# Patient Record
Sex: FEMALE | Race: WHITE | Employment: OTHER | URBAN - METROPOLITAN AREA
[De-identification: names, ages, dates, MRNs, and addresses within clinical notes are randomized per-mention and may not be internally consistent; named-entity substitution may affect disease eponyms.]

---

## 2019-11-05 ENCOUNTER — TELEPHONE (OUTPATIENT)
Dept: INTERNAL MEDICINE CLINIC | Age: 84
End: 2019-11-05

## 2019-11-05 NOTE — TELEPHONE ENCOUNTER
Daughter Rios Tony calling, says mom was pt of RD but moved to [place]. Coming back to the area to go into assisted living but needs a pe and papers filled out. Wants to know if RD will take her back as pt?

## 2019-11-22 ENCOUNTER — OFFICE VISIT (OUTPATIENT)
Dept: CARDIOLOGY CLINIC | Age: 84
End: 2019-11-22

## 2019-11-22 VITALS
DIASTOLIC BLOOD PRESSURE: 88 MMHG | HEIGHT: 64 IN | BODY MASS INDEX: 31.41 KG/M2 | SYSTOLIC BLOOD PRESSURE: 162 MMHG | OXYGEN SATURATION: 98 % | HEART RATE: 76 BPM | WEIGHT: 184 LBS

## 2019-11-22 DIAGNOSIS — I48.91 ATRIAL FIBRILLATION, UNSPECIFIED TYPE (HCC): Primary | ICD-10-CM

## 2019-11-22 RX ORDER — LORATADINE 10 MG/1
10 TABLET ORAL 2 TIMES DAILY
COMMUNITY
End: 2022-06-08

## 2019-11-22 RX ORDER — LEVOTHYROXINE SODIUM 88 UG/1
TABLET ORAL
COMMUNITY

## 2019-11-22 RX ORDER — TRAZODONE HYDROCHLORIDE 50 MG/1
TABLET ORAL
COMMUNITY
End: 2022-06-08

## 2019-11-22 RX ORDER — OXYBUTYNIN CHLORIDE 5 MG/1
5 TABLET ORAL 2 TIMES DAILY
COMMUNITY
End: 2022-06-08

## 2019-11-22 NOTE — PROGRESS NOTES
Igor Mary Ann Simba    \"Needs a pacemaker\"    HPI    Saadia Choi is a 80 y. o. with h/o persistent AFib, CAD s/p PCI LAD 2005, HTN, DM2, Dyslipidemia, h/o TIA here to reestablish her cardiovascular care after a syncopal episode. This is a previous patient of our practice but who relocated-had been living in [place] for the last several years. She comes with her daughter today as they have just recently moved back to [place] and patient will be placed in an assisted living in the very near future. Unfortunately they have very little records from her hospitalizations and I have no records from her previous cardiologist.  27 the history comes from the daughter who is a very good historian and takes care of her mother. Apparently fell last year as a result of a stroke but unfortunately brain bleed because of the fall. She had been on Eliquis at the time for her atrial fibrillation. The Eliquis was stopped-he has not been on it since. She has suffered with UTIs frequent falls again which many of these falls were not witnessed. It is not clear if she truly lost consciousness regardless she was hospitalized many times and this happened. Previously been on Eliquis, hydralazine, hydrochlorothiazide, metoprolol and Lasix. Since daughter says her heart rate was in the 20s at night and so her metoprolol was stopped but still the heart rates could go down into the 40s. Also having low blood pressures and problems with potassium so essentially all of her medications were stopped-and she was placed on a 24-hour Holter monitor. 2 days before they had plan to move back here to [place] she says the cardiologist called her and told her that her mom needs a pacemaker. They were already moving he said it was okay for her to establish with somebody here as an urgent need to do it right away. Luckily she has not passed out again and not had any recent problems.   No swelling chest pain or shortness of breath or recurrent syncope. Does complain of being tired and weak-should be noted that she is gone through quite a bit of rehab and physical therapy and her functional status is greatly improved to where it had been after her last stroke. Past Medical History:   Diagnosis Date    Adrenal mass ([address]     benign on serial f/u by CT    Allergic rhinitis 10/30/2012    Atrial fibrillation ([address] 1/14/2011    CAD (coronary artery disease), native coronary artery     Calculus of kidney     Dr. [facility])     left breast carcinoma    Coronary stent 9/2005    drug eluting stent to proximal LAD    Diabetes mellitus ([address]     microalbuminuria    Dyslipidemia     History of echocardiogram 11/30/2009    Tech diff. A-Fib. EF 60-65%. Mild conc LVH. Mild LAE. Mild MR. Mod RVE. Mod TR.  RVSP 45-50.  History of myocardial perfusion scan 08/12/2013    No ischemia or prior infarction. Very sm area of apical thinning. Hyperdynamic LV function. EF >80%. Equivocally positive EKG on pharm stress test.  Low risk.  Hypertension     Osteopenia     DEXA -1.4 spine, -1.5 hip w FRAX 11 and 2.2 from 6/11    Pulmonary hypertension, secondary ([address]     S/P cardiac cath 08/29/2005    LM normal.  mLAD 100%. mCx 25%. PDA 30%. RCA mild. EF 60-65%. AA mildly dilated.         Past Surgical History:   Procedure Laterality Date    CARDIAC SURG PROCEDURE UNLIST  9/1/2005    coronary angiography stent deplyoment    CARDIAC SURG PROCEDURE UNLIST  8/13    thallium negative ef 80%    HX CORONARY STENT PLACEMENT      HX GI  03/20/08    Laproscopic low anterior resection Dr. Doyle Clancy  02/2007    left modified     HX UROLOGICAL  6/2/2016    [address], Dr. Sonali Starks, Cystoscopy, retrograde ureteroscopy, double-J catheter    [place] COLONOSCOPY FLX DX W/COLLJ [facility] REH[facility]ILITATION WHEN PFRMD  2008    s/p [facility] for adenoma Dr. Russ Lozoya       Current Outpatient Medications   Medication Sohail Barker cyanocobalamin, vitamin B-12, 2,000 mcg tab Take 2,000 mcg by mouth daily. 30 Tab 0    folic acid (FOLVITE) 1 mg tablet Take 1 Tab by mouth daily. 30 Tab 0    hydrALAZINE (APRESOLINE) 10 mg tablet Take 1 Tab by mouth three (3) times daily. 90 Tab 0    polyethylene glycol (MIRALAX) 17 gram packet Take 1 Packet by mouth daily. 30 Packet 0    OTHER This is to certify that Ivy Morris was admitted to [facility] from 5/30/2016 to 6/15/16. Please feel free to contact my office if you have any questions or concerns. Thank you. 1 Each 0    HYDROcodone-acetaminophen (NORCO) 5-325 mg per tablet Take 1 Tab by mouth every eight (8) hours as needed for Pain. Max Daily Amount: 3 Tabs. 12 Tab 0    OTHER Incentive Spirometry- use as directed 1 Each 0    apixaban (ELIQUIS) 5 mg tablet Take 5 mg by mouth two (2) times a day.  escitalopram oxalate (LEXAPRO) 10 mg tablet Take 10 mg by mouth daily as needed.  levothyroxine (SYNTHROID) 75 mcg tablet TAKE 1 TABLET EVERY DAY BEFORE BREAKFAST 90 Tab 3    atorvastatin (LIPITOR) 40 mg tablet Take 1 Tab by mouth daily. 90 Tab 3    fluticasone (FLONASE) 50 mcg/actuation nasal spray 2 sprays each nostril daily 1 Bottle 12    pantoprazole (PROTONIX) 40 mg tablet Take 1 Tab by mouth daily. 90 Tab 3    ergocalciferol (ERGOCALCIFEROL) 50,000 unit capsule Take 1 Cap by mouth every seven (7) days. 13 Cap 4    ramipril (ALTACE) 10 mg capsule Take 1 Cap by mouth daily. 90 Cap 3    budesonide-formoterol (SYMBICORT) 160-4.5 mcg/Actuation HFA inhaler Take 2 Puffs by inhalation as needed.  albuterol (VENTOLIN HFA) 90 mcg/Actuation inhaler Take 1 Puff by inhalation as needed.          Allergies   Allergen Reactions    Amiodarone Other (comments)     hallucinations    Codeine Unable to Obtain    Sulfa (Sulfonamide Antibiotics) Unable to Obtain       Social History     Socioeconomic History    Marital status:      Spouse name: Not on file    Number of children: 6    Years of education: Not on file    Highest education level: Not on file   Occupational History    Occupation: retired    Social Needs    Financial resource strain: Not on file    Food insecurity:     Worry: Not on file     Inability: Not on file   [employer] needs:     Medical: Not on file     Non-medical: Not on file   Tobacco Use    Smoking status: Never Smoker    Smokeless tobacco: Never Used   Substance and Sexual Activity    Alcohol use: No    Drug use: No    Sexual activity: Not on file   Lifestyle    Physical activity:     Days per week: Not on file     Minutes per session: Not on file    Stress: Not on file   Relationships    Social connections:     Talks on phone: Not on file     Gets together: Not on file     Attends Mandaeism service: Not on file     Active member of club or organization: Not on file     Attends meetings of clubs or organizations: Not on file     Relationship status: Not on file    Intimate partner violence:     Fear of current or ex partner: Not on file     Emotionally abused: Not on file     Physically abused: Not on file     Forced sexual activity: Not on file   Other Topics Concern    Not on file   Social History Narrative    Not on file        FH: n/a    Review of Systems    14 pt Review of Systems is negative unless otherwise mentioned in the HPI.     Wt Readings from Last 3 Encounters:   11/22/19 83.5 kg (184 lb)   06/22/16 77.1 kg (170 lb)   06/15/16 77.2 kg (170 lb 3.2 oz)     Temp Readings from Last 3 Encounters:   06/22/16 98.4 °F (36.9 °C)   06/15/16 98.2 °F (36.8 °C)   08/12/15 98.6 °F (37 °C)     BP Readings from Last 3 Encounters:   11/22/19 162/88   06/23/16 102/47   06/15/16 112/60     Pulse Readings from Last 3 Encounters:   11/22/19 76   06/23/16 (!) 48   06/15/16 (!) 56            Physical Exam:    Visit Vitals  /88 (BP 1 Location: Right arm, BP Patient Position: Sitting)   Pulse 76   Ht 5' 4\" (1.626 m)   Wt 83.5 kg (184 lb)   SpO2 98%   BMI 31.58 kg/m²      Physical Exam  Vitals signs reviewed. HENT:      Head: Normocephalic and atraumatic. Eyes:      Pupils: Pupils are equal, round, and reactive to light. Cardiovascular:      Rate and Rhythm: Normal rate and regular rhythm. Heart sounds: Normal heart sounds. No murmur. No friction rub. No gallop. Pulmonary:      Effort: Pulmonary effort is normal. No respiratory distress. Breath sounds: Normal breath sounds. No wheezing or rales. Chest:      Chest wall: No tenderness. Abdominal:      General: Bowel sounds are normal.      Palpations: Abdomen is soft. Musculoskeletal:         General: No tenderness. Skin:     General: Skin is warm and dry. Neurological:      Mental Status: She is alert and oriented to person, place, and time. EKG today shows: NSR, normal axis and intervals, no ST segment abnormalities    Impression and Plan:  Patt Montesinos is a 80 y. o. with:    1.) pAF with likely SSS (HRs dropping to 40s & h/o syncope)  2.) CAD s/p PCI LAD 05'  3.) TIA/ CVA with brain bleed s/p fall  4.) DM2  5.) HTN  6.) Dyslipidemia    1.) Need to request records from Tx cardiologist, incl holter monitor  2.) Once have documented SSS- will plan to proceed with PPM  3.) Agree no anticoag despite high stroke risk- due to brain bleed and falls  4.) Also likely need to add back an anti-HTN med, probably Hydralazine   5.) RTC 3 months, plan to call her back in a week with plan, please call us if you dont hear back     EKG and HR/ SBP stable today but evidence of SSS has been documented on recent holter  Once we get these records, I will send her for an outpt PPM with my partner  Recs to follow    Thank you for allowing me to participate in the care of your patient, please do not hesitate to call with questions or concerns.     Kindest Regards,    Eneida Tamayo, DO

## 2020-02-26 ENCOUNTER — OFFICE VISIT (OUTPATIENT)
Dept: CARDIOLOGY CLINIC | Age: 85
End: 2020-02-26

## 2020-02-26 VITALS
WEIGHT: 193 LBS | HEIGHT: 64 IN | DIASTOLIC BLOOD PRESSURE: 68 MMHG | BODY MASS INDEX: 32.95 KG/M2 | OXYGEN SATURATION: 98 % | SYSTOLIC BLOOD PRESSURE: 128 MMHG | HEART RATE: 80 BPM

## 2020-02-26 DIAGNOSIS — I49.5 SICK SINUS SYNDROME (HCC): Primary | ICD-10-CM

## 2020-02-26 DIAGNOSIS — I48.91 ATRIAL FIBRILLATION, UNSPECIFIED TYPE (HCC): ICD-10-CM

## 2020-02-26 NOTE — PATIENT INSTRUCTIONS
[facility] Pacemaker Placement 1. You are scheduled to have a Pacemaker Placement on  3/20/20 , at 9:15 am.    Please check in at 8:15 am. 
 
2. Please go to [facility] and park in the outpatient parking lot that is located around to the back of the hospital and enter through the [facility] building. Once you enter through the [facility] check in with the  there. The  will either give you directions or assist you in getting to the cath holding area. 3.  You are not to eat or drink anything after midnight the night before your procedure. 4. Please continue to take your medications with a small sip of water on the morning of the procedure with the following exceptions: n/a  
 
5. If you are diabetic, do not take your insulin/sugar pill the morning of the procedure. 6. We encourage families to wait in the waiting room on the first floor while the procedure is being done. The Doctor will come out and talk with you as soon as the procedure is over. 7. There is the possibility that you may spend the night in the hospital, depending on the results of the procedure. This will be determined after the procedure is done. 8.   If you or your family have any questions, please call our office Monday-Friday 9:00am  
      -4:30 pm , at [phone], and ask to speak to one of the nurses.

## 2020-02-26 NOTE — PROGRESS NOTES
Leonarda Marie Simba    \"Needs a pacemaker\"    HPI    Lyudmila Starkey is a 80 y. o. with h/o persistent AFib, CAD s/p PCI LAD 2005, HTN, DM2, Dyslipidemia, h/o TIA here to reestablish her cardiovascular care after a syncopal episode. This is a previous patient of our practice but who relocated-had been living in [place] for the last several years. She comes with her daughter today as they have just recently moved back to [place] and patient will be placed in an assisted living. I was able to obtain records- please see media and pt having HRs as low as 39 with such severe fatigue she can barely make the bed. Apparently fell last year as a result of a stroke but unfortunately brain bleed because of the fall. She had been on Eliquis at the time for her atrial fibrillation. The Eliquis was stopped-she has not been on it since. She has suffered with UTIs frequent falls again which many of these falls were not witnessed. It is not clear if she truly lost consciousness regardless she was hospitalized many times and this happened. Previously been on Eliquis, hydralazine, hydrochlorothiazide, metoprolol and Lasix. Since daughter says her heart rate was in the 20s at night and so her metoprolol was stopped but still the heart rates could go down into the 40s. Also having low blood pressures and problems with potassium so essentially all of her medications were stopped-and she was placed on a 24-hour Holter monitor. 2 days before they had plan to move back here to [place] she says the cardiologist called her and told her that her mom needs a pacemaker. They were already moving he said it was okay for her to establish with somebody here as an urgent need to do it right away. Luckily she has not passed out again and not had any recent problems. No swelling, chest pain or shortness of breath or recurrent syncope.   Does complain of being tired and weak-should be noted that she is gone through quite a bit of rehab and physical therapy and her functional status is greatly improved to where it had been after her last stroke. The left side of her body is generally weaker from her stroke and she feels her left hand is \"puffy\" today but otherwise no new changes. No recent infections etc. Still so tired, she cant make her bed. Past Medical History:   Diagnosis Date    Adrenal [facility] [address]     benign on serial f/u by CT    Allergic rhinitis 10/30/2012    Atrial fibrillation ([facility] 1/14/2011    CAD (coronary artery disease), native coronary artery     Calculus of kidney     Dr. Thelma Hernandez [facility])     left breast carcinoma    Coronary stent 9/2005    drug eluting stent to proximal LAD    Diabetes mellitus ([facility]     microalbuminuria    Dyslipidemia     History of echocardiogram 11/30/2009    Tech diff. A-Fib. EF 60-65%. Mild conc LVH. Mild LAE. Mild MR. Mod RVE. Mod TR.  RVSP 45-50.  History of myocardial perfusion scan 08/12/2013    No ischemia or prior infarction. Very sm area of apical thinning. Hyperdynamic LV function. EF >80%. Equivocally positive EKG on pharm stress test.  Low risk.  Hypertension     Osteopenia     DEXA -1.4 spine, -1.5 hip w FRAX 11 and 2.2 from 6/11    Pulmonary hypertension, secondary ([facility]     S/P cardiac cath 08/29/2005    LM normal.  mLAD 100%. mCx 25%. PDA 30%. RCA mild. EF 60-65%. AA mildly dilated.         Past Surgical History:   Procedure Laterality Date    CARDIAC SURG PROCEDURE UNLIST  9/1/2005    coronary angiography stent deplyoment    CARDIAC SURG PROCEDURE UNLIST  8/13    thallium negative ef 80%    HX CORONARY STENT PLACEMENT      HX GI  03/20/08    Laproscopic low anterior resection Dr. Pasquale Mcfarlane [address]  02/2007    left modified     HX UROLOGICAL  6/2/2016    [facility], Dr. Enoch Moore, Cystoscopy, retrograde ureteroscopy, double-J catheter    [place] COLONOSCOPY FLX DX W/COLLJ [facility] WHEN PFRMD  2008    s/p [facility] for adenoma  Birgit Jim       Current Outpatient Medications   Medication Sig Dispense Refill    levothyroxine (SYNTHROID) 88 mcg tablet Take  by mouth Daily (before breakfast).  loratadine (CLARITIN) 10 mg tablet Take 10 mg by mouth two (2) times a day.  traZODone (DESYREL) 50 mg tablet Take  by mouth nightly.  oxybutynin (DITROPAN) 5 mg tablet Take 5 mg by mouth two (2) times a day.  folic acid (FOLVITE) 1 mg tablet Take 1 Tab by mouth daily. 30 Tab 0    hydrALAZINE (APRESOLINE) 10 mg tablet Take 1 Tab by mouth three (3) times daily. 90 Tab 0    OTHER This is to certify that Oswald Hernandez was admitted to [facility] from 5/30/2016 to 6/15/16. Please feel free to contact my office if you have any questions or concerns. Thank you. 1 Each 0    HYDROcodone-acetaminophen (NORCO) 5-325 mg per tablet Take 1 Tab by mouth every eight (8) hours as needed for Pain. Max Daily Amount: 3 Tabs. 12 Tab 0    OTHER Incentive Spirometry- use as directed 1 Each 0    escitalopram oxalate (LEXAPRO) 10 mg tablet Take 10 mg by mouth daily as needed.  atorvastatin (LIPITOR) 40 mg tablet Take 1 Tab by mouth daily. 90 Tab 3    fluticasone (FLONASE) 50 mcg/actuation nasal spray 2 sprays each nostril daily 1 Bottle 12    pantoprazole (PROTONIX) 40 mg tablet Take 1 Tab by mouth daily. 90 Tab 3    budesonide-formoterol (SYMBICORT) 160-4.5 mcg/Actuation HFA inhaler Take 2 Puffs by inhalation as needed.  albuterol (VENTOLIN HFA) 90 mcg/Actuation inhaler Take 1 Puff by inhalation as needed.          Allergies   Allergen Reactions    Amiodarone Other (comments)     hallucinations    Codeine Unable to Obtain    Sulfa (Sulfonamide Antibiotics) Unable to Obtain       Social History     Socioeconomic History    Marital status:      Spouse name: Not on file    Number of children: 6    Years of education: Not on file    Highest education level: Not on file   Occupational History    Occupation: retired    Social Needs    Financial resource strain: Not on file    Food insecurity:     Worry: Not on file     Inability: Not on file   [employer] needs:     Medical: Not on file     Non-medical: Not on file   Tobacco Use    Smoking status: Never Smoker    Smokeless tobacco: Never Used   Substance and Sexual Activity    Alcohol use: No    Drug use: No    Sexual activity: Not on file   Lifestyle    Physical activity:     Days per week: Not on file     Minutes per session: Not on file    Stress: Not on file   Relationships    Social connections:     Talks on phone: Not on file     Gets together: Not on file     Attends Caodaism service: Not on file     Active member of club or organization: Not on file     Attends meetings of clubs or organizations: Not on file     Relationship status: Not on file    Intimate partner violence:     Fear of current or ex partner: Not on file     Emotionally abused: Not on file     Physically abused: Not on file     Forced sexual activity: Not on file   Other Topics Concern    Not on file   Social History Narrative    Not on file        FH: n/a    Review of Systems    14 pt Review of Systems is negative unless otherwise mentioned in the HPI. Wt Readings from Last 3 Encounters:   11/22/19 83.5 kg (184 lb)   06/22/16 77.1 kg (170 lb)   06/15/16 77.2 kg (170 lb 3.2 oz)     Temp Readings from Last 3 Encounters:   06/22/16 98.4 °F (36.9 °C)   06/15/16 98.2 °F (36.8 °C)   08/12/15 98.6 °F (37 °C)     BP Readings from Last 3 Encounters:   11/22/19 162/88   06/23/16 102/47   06/15/16 112/60     Pulse Readings from Last 3 Encounters:   11/22/19 76   06/23/16 (!) 48   06/15/16 (!) 56            Physical Exam:    Visit Vitals  Ht 5' 4\" (1.626 m)   BMI 31.58 kg/m²      Physical Exam  Vitals signs reviewed. HENT:      Head: Normocephalic and atraumatic. Eyes:      Pupils: Pupils are equal, round, and reactive to light.    Cardiovascular:      Rate and Rhythm: Normal rate and regular rhythm. Heart sounds: Normal heart sounds. No murmur. No friction rub. No gallop. Pulmonary:      Effort: Pulmonary effort is normal. No respiratory distress. Breath sounds: Normal breath sounds. No wheezing or rales. Chest:      Chest wall: No tenderness. Abdominal:      General: Bowel sounds are normal.      Palpations: Abdomen is soft. Musculoskeletal:         General: No tenderness. Skin:     General: Skin is warm and dry. Neurological:      Mental Status: She is alert and oriented to person, place, and time. EKG last: NSR, normal axis and intervals, no ST segment abnormalities    Impression and Plan:  Lynne Carlson is a 80 y. o. with:    1.) pAF with likely SSS (HRs dropping to 39, see monitor scanned into media)  2.) CAD s/p PCI LAD 05'  3.) TIA/ CVA with brain bleed s/p fall  4.) DM2  5.) HTN  6.) Dyslipidemia  7.) Normal LV function echo 12/2019    1.) Plan for outpt PPM for SSS, indications risks benefits etc dw pt and daughter who want to proceed  2.) Once have documented SSS- will plan to proceed with PPM  3.) Agree no anticoag despite high stroke risk- due to brain bleed and falls  4.) RTC after device close recheck, then likely q 6 months alternating with NP    Thank you for allowing me to participate in the care of your patient, please do not hesitate to call with questions or concerns.     Kindest Regards,    Jyoti Weaver, DO

## 2020-02-26 NOTE — PROGRESS NOTES
Jak Longoria presents today for   Chief Complaint   Patient presents with    Irregular Heart Beat     6 month follow up       Jak Longoria preferred language for health care discussion is english/other. Is someone accompanying this pt? no    Is the patient using any DME equipment during OV?no    Depression Screening:  3 most recent PHQ Screens 2/26/2020   Little interest or pleasure in doing things Not at all   Feeling down, depressed, irritable, or hopeless Not at all   Total Score PHQ 2 0       Learning Assessment:  Learning Assessment 2/11/2014   PRIMARY LEARNER Patient   HIGHEST LEVEL OF EDUCATION - PRIMARY LEARNER  DID NOT GRADUATE HIGH SCHOOL   BARRIERS PRIMARY LEARNER NONE   CO-LEARNER CAREGIVER No   PRIMARY LANGUAGE ENGLISH   LEARNER PREFERENCE PRIMARY DEMONSTRATION   ANSWERED BY patient   RELATIONSHIP SELF       Abuse Screening:  No flowsheet data found. Fall Risk  Fall Risk Assessment, last 12 mths 2/26/2020   Able to walk? Yes   Fall in past 12 months? No       Pt currently taking Anticoagulant therapy?no    Coordination of Care:  1. Have you been to the ER, urgent care clinic since your last visit? Hospitalized since your last visit? no    2. Have you seen or consulted any other health care providers outside of the [address] [place] [address] since your last visit? Include any pap smears or colon screening. no

## 2020-03-09 ENCOUNTER — HOSPITAL ENCOUNTER (OUTPATIENT)
Dept: PREADMISSION TESTING | Age: 85
Discharge: HOME | End: 2020-03-09
Payer: MEDICARE

## 2020-03-09 ENCOUNTER — HOSPITAL ENCOUNTER (OUTPATIENT)
Dept: GENERAL RADIOLOGY | Age: 85
Discharge: HOME | End: 2020-03-09
Payer: MEDICARE

## 2020-03-09 DIAGNOSIS — I48.91 ATRIAL FIBRILLATION, UNSPECIFIED TYPE (HCC): ICD-10-CM

## 2020-03-09 DIAGNOSIS — I49.5 SICK SINUS SYNDROME (HCC): ICD-10-CM

## 2020-03-09 LAB
ALBUMIN SERPL-MCNC: 3.6 G/DL (ref 3.4–5)
ALBUMIN/GLOB SERPL: 0.9 {RATIO} (ref 0.8–1.7)
ALP SERPL-CCNC: 137 U/L (ref 45–117)
ALT SERPL-CCNC: 22 U/L (ref 13–56)
ANION GAP SERPL CALC-SCNC: 7 MMOL/L (ref 3–18)
AST SERPL-CCNC: 23 U/L (ref 10–38)
BASOPHILS # BLD: 0.1 K/UL (ref 0–0.1)
BASOPHILS NFR BLD: 1 % (ref 0–2)
BILIRUB SERPL-MCNC: 0.6 MG/DL (ref 0.2–1)
BUN SERPL-MCNC: 25 MG/DL (ref 7–18)
BUN/CREAT SERPL: 19 (ref 12–20)
CALCIUM SERPL-MCNC: 9.2 MG/DL (ref 8.5–10.1)
CHLORIDE SERPL-SCNC: 103 MMOL/L (ref 100–111)
CO2 SERPL-SCNC: 28 MMOL/L (ref 21–32)
CREAT SERPL-MCNC: 1.34 MG/DL (ref 0.6–1.3)
DIFFERENTIAL METHOD BLD: ABNORMAL
EOSINOPHIL # BLD: 0.1 K/UL (ref 0–0.4)
EOSINOPHIL NFR BLD: 1 % (ref 0–5)
ERYTHROCYTE [DISTWIDTH] IN BLOOD BY AUTOMATED COUNT: 16.4 % (ref 11.6–14.5)
GLOBULIN SER CALC-MCNC: 3.8 G/DL (ref 2–4)
GLUCOSE SERPL-MCNC: 108 MG/DL (ref 74–99)
HCT VFR BLD AUTO: 37.3 % (ref 35–45)
HGB BLD-MCNC: 11.5 G/DL (ref 12–16)
INR PPP: 1.1 (ref 0.8–1.2)
LYMPHOCYTES # BLD: 1.2 K/UL (ref 0.9–3.6)
LYMPHOCYTES NFR BLD: 15 % (ref 21–52)
MCH RBC QN AUTO: 24.7 PG (ref 24–34)
MCHC RBC AUTO-ENTMCNC: 30.8 G/DL (ref 31–37)
MCV RBC AUTO: 80 FL (ref 74–97)
MONOCYTES # BLD: 0.9 K/UL (ref 0.05–1.2)
MONOCYTES NFR BLD: 11 % (ref 3–10)
NEUTS SEG # BLD: 6 K/UL (ref 1.8–8)
NEUTS SEG NFR BLD: 72 % (ref 40–73)
PLATELET # BLD AUTO: 306 K/UL (ref 135–420)
PMV BLD AUTO: 10.4 FL (ref 9.2–11.8)
POTASSIUM SERPL-SCNC: 4.8 MMOL/L (ref 3.5–5.5)
PROT SERPL-MCNC: 7.4 G/DL (ref 6.4–8.2)
PROTHROMBIN TIME: 13.8 SEC (ref 11.5–15.2)
RBC # BLD AUTO: 4.66 M/UL (ref 4.2–5.3)
SODIUM SERPL-SCNC: 138 MMOL/L (ref 136–145)
WBC # BLD AUTO: 8.3 K/UL (ref 4.6–13.2)

## 2020-03-09 PROCEDURE — 85025 COMPLETE CBC W/AUTO DIFF WBC: CPT

## 2020-03-09 PROCEDURE — 71046 X-RAY EXAM CHEST 2 VIEWS: CPT

## 2020-03-09 PROCEDURE — 80053 COMPREHEN METABOLIC PANEL: CPT

## 2020-03-09 PROCEDURE — 85610 PROTHROMBIN TIME: CPT

## 2020-03-09 PROCEDURE — 36415 COLL VENOUS BLD VENIPUNCTURE: CPT

## 2020-03-09 NOTE — PROGRESS NOTES
responded to [facility] for  [employer], who is a 80 y.o.,female,     The  provided the following Interventions:  Provided crisis spiritual care and support. Offered prayers on behalf for the patient. Chart reviewed. The following outcomes were achieved:      Assessment:  There are no further spiritual or Taoism issues which require intervention at this time. Plan:  Chaplains will continue to follow and will provide spiritual care as needed.  recommends bedside caregivers page  on duty if patient or family shows signs of acute spiritual or emotional distress. Chaplain Bartolo CUMMINGS  [address] [place] [address]   [phone]

## 2020-03-11 ENCOUNTER — HOSPITAL ENCOUNTER (EMERGENCY)
Age: 85
Discharge: HOME | End: 2020-03-12
Attending: EMERGENCY MEDICINE
Payer: MEDICARE

## 2020-03-11 DIAGNOSIS — M79.661 RIGHT CALF PAIN: Primary | ICD-10-CM

## 2020-03-11 DIAGNOSIS — M25.561 ACUTE PAIN OF RIGHT KNEE: ICD-10-CM

## 2020-03-11 LAB
ALBUMIN SERPL-MCNC: 3.6 G/DL (ref 3.4–5)
ALBUMIN/GLOB SERPL: 0.9 {RATIO} (ref 0.8–1.7)
ALP SERPL-CCNC: 129 U/L (ref 45–117)
ALT SERPL-CCNC: 20 U/L (ref 13–56)
ANION GAP SERPL CALC-SCNC: 5 MMOL/L (ref 3–18)
APTT PPP: 32.1 SEC (ref 23–36.4)
AST SERPL-CCNC: 23 U/L (ref 10–38)
BASOPHILS # BLD: 0 K/UL (ref 0–0.1)
BASOPHILS NFR BLD: 0 % (ref 0–2)
BILIRUB SERPL-MCNC: 0.6 MG/DL (ref 0.2–1)
BUN SERPL-MCNC: 20 MG/DL (ref 7–18)
BUN/CREAT SERPL: 14 (ref 12–20)
CALCIUM SERPL-MCNC: 8.9 MG/DL (ref 8.5–10.1)
CHLORIDE SERPL-SCNC: 106 MMOL/L (ref 100–111)
CO2 SERPL-SCNC: 29 MMOL/L (ref 21–32)
CREAT SERPL-MCNC: 1.39 MG/DL (ref 0.6–1.3)
DIFFERENTIAL METHOD BLD: ABNORMAL
EOSINOPHIL # BLD: 0.1 K/UL (ref 0–0.4)
EOSINOPHIL NFR BLD: 1 % (ref 0–5)
ERYTHROCYTE [DISTWIDTH] IN BLOOD BY AUTOMATED COUNT: 16.5 % (ref 11.6–14.5)
GLOBULIN SER CALC-MCNC: 4.1 G/DL (ref 2–4)
GLUCOSE SERPL-MCNC: 127 MG/DL (ref 74–99)
HCT VFR BLD AUTO: 36.5 % (ref 35–45)
HGB BLD-MCNC: 11.3 G/DL (ref 12–16)
INR PPP: 1.1 (ref 0.8–1.2)
LYMPHOCYTES # BLD: 1.5 K/UL (ref 0.9–3.6)
LYMPHOCYTES NFR BLD: 17 % (ref 21–52)
MCH RBC QN AUTO: 24.7 PG (ref 24–34)
MCHC RBC AUTO-ENTMCNC: 31 G/DL (ref 31–37)
MCV RBC AUTO: 79.7 FL (ref 74–97)
MONOCYTES # BLD: 1 K/UL (ref 0.05–1.2)
MONOCYTES NFR BLD: 12 % (ref 3–10)
NEUTS SEG # BLD: 6.1 K/UL (ref 1.8–8)
NEUTS SEG NFR BLD: 70 % (ref 40–73)
PLATELET # BLD AUTO: 263 K/UL (ref 135–420)
PMV BLD AUTO: 10.1 FL (ref 9.2–11.8)
POTASSIUM SERPL-SCNC: 4.1 MMOL/L (ref 3.5–5.5)
PROT SERPL-MCNC: 7.7 G/DL (ref 6.4–8.2)
PROTHROMBIN TIME: 13.9 SEC (ref 11.5–15.2)
RBC # BLD AUTO: 4.58 M/UL (ref 4.2–5.3)
SODIUM SERPL-SCNC: 140 MMOL/L (ref 136–145)
WBC # BLD AUTO: 8.7 K/UL (ref 4.6–13.2)

## 2020-03-11 PROCEDURE — 85610 PROTHROMBIN TIME: CPT

## 2020-03-11 PROCEDURE — 85025 COMPLETE CBC W/AUTO DIFF WBC: CPT

## 2020-03-11 PROCEDURE — 99283 EMERGENCY DEPT VISIT LOW MDM: CPT

## 2020-03-11 PROCEDURE — 85730 THROMBOPLASTIN TIME PARTIAL: CPT

## 2020-03-11 PROCEDURE — 80053 COMPREHEN METABOLIC PANEL: CPT

## 2020-03-12 ENCOUNTER — APPOINTMENT (OUTPATIENT)
Dept: VASCULAR SURGERY | Age: 85
End: 2020-03-12
Attending: PHYSICIAN ASSISTANT
Payer: MEDICARE

## 2020-03-12 VITALS
WEIGHT: 192 LBS | SYSTOLIC BLOOD PRESSURE: 135 MMHG | HEIGHT: 64 IN | BODY MASS INDEX: 32.78 KG/M2 | OXYGEN SATURATION: 97 % | RESPIRATION RATE: 18 BRPM | DIASTOLIC BLOOD PRESSURE: 68 MMHG | TEMPERATURE: 97.1 F | HEART RATE: 55 BPM

## 2020-03-12 PROCEDURE — 93971 EXTREMITY STUDY: CPT

## 2020-03-12 PROCEDURE — 74011250637 HC RX REV CODE- 250/637: Performed by: PHYSICIAN ASSISTANT

## 2020-03-12 RX ORDER — ACETAMINOPHEN 325 MG/1
650 TABLET ORAL ONCE
Status: COMPLETED | OUTPATIENT
Start: 2020-03-12 | End: 2020-03-12

## 2020-03-12 RX ADMIN — ACETAMINOPHEN 650 MG: 325 TABLET ORAL at 00:44

## 2020-03-12 NOTE — ED PROVIDER NOTES
EMERGENCY DEPARTMENT HISTORY AND PHYSICAL EXAM    11:08 PM      Date: 3/11/2020  Patient Name: Lynne Carlson    History of Presenting Illness     CC: Leg pain      History Provided By: Patient    Additional History (Context): Lynne Carlson is a 80 y.o. female with afib, CVA, HTN, HLD who presents with complaints of right leg pain x 3 days. Patient states that the pain is behind her right knee, and radiates into the calf. She denies trauma or injury. Patient reports that she is usually ambulatory with a walker but has been having trouble with this at home. She denies any fevers or chills at home. PCP: Leonard Brandon MD    Current Outpatient Medications   Medication Sig Dispense Refill    levothyroxine (SYNTHROID) 88 mcg tablet Take  by mouth Daily (before breakfast).  loratadine (CLARITIN) 10 mg tablet Take 10 mg by mouth two (2) times a day.  traZODone (DESYREL) 50 mg tablet Take  by mouth nightly.  oxybutynin (DITROPAN) 5 mg tablet Take 5 mg by mouth two (2) times a day.  folic acid (FOLVITE) 1 mg tablet Take 1 Tab by mouth daily. 30 Tab 0    hydrALAZINE (APRESOLINE) 10 mg tablet Take 1 Tab by mouth three (3) times daily. 90 Tab 0    OTHER This is to certify that Lynne Carlson was admitted to [facility] from 5/30/2016 to 6/15/16. Please feel free to contact my office if you have any questions or concerns. Thank you. 1 Each 0    HYDROcodone-acetaminophen (NORCO) 5-325 mg per tablet Take 1 Tab by mouth every eight (8) hours as needed for Pain. Max Daily Amount: 3 Tabs. 12 Tab 0    OTHER Incentive Spirometry- use as directed 1 Each 0    escitalopram oxalate (LEXAPRO) 10 mg tablet Take 10 mg by mouth daily as needed.  atorvastatin (LIPITOR) 40 mg tablet Take 1 Tab by mouth daily.  90 Tab 3    fluticasone (FLONASE) 50 mcg/actuation nasal spray 2 sprays each nostril daily 1 Bottle 12    pantoprazole (PROTONIX) 40 mg tablet Take 1 Tab by mouth daily. 90 Tab 3    budesonide-formoterol (SYMBICORT) 160-4.5 mcg/Actuation HFA inhaler Take 2 Puffs by inhalation as needed.  albuterol (VENTOLIN HFA) 90 mcg/Actuation inhaler Take 1 Puff by inhalation as needed. Past History     Past Medical History:  Past Medical History:   Diagnosis Date    Adrenal mass ([facility] [address]     benign on serial f/u by CT    Allergic rhinitis 10/30/2012    Atrial fibrillation ([facility] [address] 1/14/2011    CAD (coronary artery disease), native coronary artery     Calculus of kidney     Dr. Liyah Hoyt [facility])     left breast carcinoma    Coronary stent 9/2005    drug eluting stent to proximal LAD    Diabetes mellitus ([facility] [address]     microalbuminuria    Dyslipidemia     History of echocardiogram 11/30/2009    Tech diff. A-Fib. EF 60-65%. Mild conc LVH. Mild LAE. Mild MR. Mod RVE. Mod TR.  RVSP 45-50.  History of myocardial perfusion scan 08/12/2013    No ischemia or prior infarction. Very sm area of apical thinning. Hyperdynamic LV function. EF >80%. Equivocally positive EKG on pharm stress test.  Low risk.  Hypertension     Osteopenia     DEXA -1.4 spine, -1.5 hip w FRAX 11 and 2.2 from 6/11    Pulmonary hypertension, secondary     S/P cardiac cath 08/29/2005    LM normal.  mLAD 100%. mCx 25%. PDA 30%. RCA mild. EF 60-65%. AA mildly dilated.         Past Surgical History:  Past Surgical History:   Procedure Laterality Date    CARDIAC SURG PROCEDURE UNLIST  9/1/2005    coronary angiography stent deplyoment    CARDIAC SURG PROCEDURE UNLIST  8/13    thallium negative ef 80%    HX CORONARY STENT PLACEMENT      HX GI  03/20/08    Laproscopic low anterior resection Dr. Marcos Mccarthy  02/2007    left modified     HX UROLOGICAL  6/2/2016    [facility], Dr. Jasen Guerin, Cystoscopy, retrograde ureteroscopy, double-J catheter    RI COLONOSCOPY FLX DX W/[facility] WHEN PFRMD  2008    s/p [facility] for adenoma Dr. Vickie Alarcon       Family History:  Family History   Problem Relation Age of Onset   24 [facility] [employer] Mother     Cancer Brother         prostate    Heart Disease Brother        Social History:  Social History     Tobacco Use    Smoking status: Never Smoker    Smokeless tobacco: Never Used   Substance Use Topics    Alcohol use: No    Drug use: No       Allergies: Allergies   Allergen Reactions    Amiodarone Other (comments)     hallucinations    Codeine Unable to Obtain    Sulfa (Sulfonamide Antibiotics) Unable to Obtain         Review of Systems       Review of Systems   Constitutional: Negative. HENT: Negative. Respiratory: Negative. Cardiovascular: Negative. Gastrointestinal: Negative. Genitourinary: Negative. Musculoskeletal: Positive for arthralgias, gait problem and myalgias. Skin: Negative. Neurological: Negative for dizziness, syncope, speech difficulty, weakness, light-headedness, numbness and headaches. All other systems reviewed and are negative. Physical Exam     Visit Vitals  /69 (BP 1 Location: Left arm)   Pulse (!) 58   Temp 98.9 °F (37.2 °C)   Resp 18   Ht 5' 4\" (1.626 m)   Wt 87.1 kg (192 lb)   SpO2 96%   BMI 32.96 kg/m²         Physical Exam  Vitals signs reviewed. Constitutional:       General: She is not in acute distress. Appearance: Normal appearance. She is not ill-appearing, toxic-appearing or diaphoretic. Comments: Elderly white female   HENT:      Head: Normocephalic and atraumatic. Right Ear: External ear normal.      Left Ear: External ear normal.      Nose: Nose normal.      Mouth/Throat:      Mouth: Mucous membranes are moist.      Pharynx: Oropharynx is clear. Eyes:      Extraocular Movements: Extraocular movements intact. Pupils: Pupils are equal, round, and reactive to light. Neck:      Musculoskeletal: Neck supple. Cardiovascular:      Rate and Rhythm: Normal rate and regular rhythm. Pulses: Normal pulses. Heart sounds: No murmur. No gallop. Pulmonary:      Effort: Pulmonary effort is normal.      Breath sounds: Normal breath sounds. No wheezing, rhonchi or rales. Skin:     General: Skin is warm and dry. Capillary Refill: Capillary refill takes less than 2 seconds. Neurological:      General: No focal deficit present. Mental Status: She is alert and oriented to person, place, and time. Cranial Nerves: No cranial nerve deficit. Sensory: No sensory deficit. Motor: No weakness. Diagnostic Study Results     Labs -  Recent Results (from the past 12 hour(s))   CBC WITH AUTOMATED DIFF    Collection Time: 03/11/20 11:15 PM   Result Value Ref Range    WBC 8.7 4.6 - 13.2 K/uL    RBC 4.58 4.20 - 5.30 M/uL    HGB 11.3 (L) 12.0 - 16.0 g/dL    HCT 36.5 35.0 - 45.0 %    MCV 79.7 74.0 - 97.0 FL    MCH 24.7 24.0 - 34.0 PG    MCHC 31.0 31.0 - 37.0 g/dL    RDW 16.5 (H) 11.6 - 14.5 %    PLATELET 380 079 - 029 K/uL    MPV 10.1 9.2 - 11.8 FL    NEUTROPHILS 70 40 - 73 %    LYMPHOCYTES 17 (L) 21 - 52 %    MONOCYTES 12 (H) 3 - 10 %    EOSINOPHILS 1 0 - 5 %    BASOPHILS 0 0 - 2 %    ABS. NEUTROPHILS 6.1 1.8 - 8.0 K/UL    ABS. LYMPHOCYTES 1.5 0.9 - 3.6 K/UL    ABS. MONOCYTES 1.0 0.05 - 1.2 K/UL    ABS. EOSINOPHILS 0.1 0.0 - 0.4 K/UL    ABS. BASOPHILS 0.0 0.0 - 0.1 K/UL    DF AUTOMATED     METABOLIC PANEL, COMPREHENSIVE    Collection Time: 03/11/20 11:15 PM   Result Value Ref Range    Sodium 140 136 - 145 mmol/L    Potassium 4.1 3.5 - 5.5 mmol/L    Chloride 106 100 - 111 mmol/L    CO2 29 21 - 32 mmol/L    Anion gap 5 3.0 - 18 mmol/L    Glucose 127 (H) 74 - 99 mg/dL    BUN 20 (H) 7.0 - 18 MG/DL    Creatinine 1.39 (H) 0.6 - 1.3 MG/DL    BUN/Creatinine ratio 14 12 - 20      GFR est AA 44 (L) >60 ml/min/1.73m2    GFR est non-AA 36 (L) >60 ml/min/1.73m2    Calcium 8.9 8.5 - 10.1 MG/DL    Bilirubin, total 0.6 0.2 - 1.0 MG/DL    ALT (SGPT) 20 13 - 56 U/L    AST (SGOT) 23 10 - 38 U/L    Alk.  phosphatase 129 (H) 45 - 117 U/L    Protein, total 7.7 6.4 - 8.2 g/dL    Albumin 3.6 3.4 - 5.0 g/dL    Globulin 4.1 (H) 2.0 - 4.0 g/dL    A-G Ratio 0.9 0.8 - 1.7     PTT    Collection Time: 03/11/20 11:15 PM   Result Value Ref Range    aPTT 32.1 23.0 - 36.4 SEC   PROTHROMBIN TIME + INR    Collection Time: 03/11/20 11:15 PM   Result Value Ref Range    Prothrombin time 13.9 11.5 - 15.2 sec    INR 1.1 0.8 - 1.2         Radiologic Studies -   No orders to display         Medical Decision Making   I am the first provider for this patient. I reviewed the vital signs, available nursing notes, past medical history, past surgical history, family history and social history. Vital Signs-Reviewed the patient's vital signs. Records Reviewed: Nursing Notes and Old Medical Records (Time of Review: 11:08 PM)    ED Course: Progress Notes, Reevaluation, and Consults:  11:08 PM  Met with patient, reviewed history, performed physical exam. Physical exam reveals tenderness to palpation on the posterior aspect of the right knee, tenderness to palpation of the right calf. Discussed case with attending physician Dr Rachel Mosquera, who recommended discussing options with the patient and her family. Options were given to start anticoagulation empirically with close outpatient follow up with her PCP vs remaining in the ED until the morning for lower extremity duplex. Patient and family opt to remain in house until the AM for lower extremity duplex. 2:59 AM : Pt care transferred to Dr. Rachel Mosquera, ED provider. History of patient complaint(s), available diagnostic reports and current treatment plan has been discussed thoroughly. Bedside rounding on patient occured : no . Intended disposition of patient : Home  Pending diagnostics reports and/or labs (please list):   LE Duplex    Provider Notes (Medical Decision Making):   80-year-old female seen in the emergency department for complaints of right leg pain x3 days.   Upon initial exam there is pitting edema bilateral patient the posterior aspect of the right knee with radiation to the right calf. Patient is awaiting a duplex of the right lower extremity, which will be done in the morning. Patient's care is been signed over to Dr. Maddie Shook for remainder of care in the emergency department. Diagnosis     Clinical Impression:   1. Right calf pain    2. Acute pain of right knee        Disposition: home     Yuval Enriquez PA-C    Dictation disclaimer:  Please note that this dictation was completed with [employer], the computer voice recognition software. Quite often unanticipated grammatical, syntax, homophones, and other interpretive errors are inadvertently transcribed by the computer software. Please disregard these errors. Please excuse any errors that have escaped final proofreading.

## 2020-03-12 NOTE — DISCHARGE INSTRUCTIONS
Patient Education        Leg Pain: Care Instructions  Your Care Instructions  Many things can cause leg pain. Too much exercise or overuse can cause a muscle cramp (or charley horse). You can get leg cramps from not eating a balanced diet that has enough potassium, calcium, and other minerals. If you do not drink enough fluids or are taking certain medicines, you may develop leg cramps. Other causes of leg pain include injuries, blood flow problems, nerve damage, and twisted and enlarged veins (varicose veins). You can usually ease pain with self-care. Your doctor may recommend that you rest your leg and keep it elevated. Follow-up care is a key part of your treatment and safety. Be sure to make and go to all appointments, and call your doctor if you are having problems. It's also a good idea to know your test results and keep a list of the medicines you take. How can you care for yourself at home? · Take pain medicines exactly as directed. ? If the doctor gave you a prescription medicine for pain, take it as prescribed. ? If you are not taking a prescription pain medicine, ask your doctor if you can take an over-the-counter medicine. · Take any other medicines exactly as prescribed. Call your doctor if you think you are having a problem with your medicine. · Rest your leg while you have pain, and avoid standing for long periods of time. · Prop up your leg at or above the level of your heart when possible. · Make sure you are eating a balanced diet that is rich in calcium, potassium, and magnesium, especially if you are pregnant. · If directed by your doctor, put ice or a cold pack on the area for 10 to 20 minutes at a time. Put a thin cloth between the ice and your skin. · Your leg may be in a splint, a brace, or an elastic bandage, and you may have crutches to help you walk. Follow your doctor's directions about how long to wear supports and how to use the crutches.   When should you call for help?  Call 911 anytime you think you may need emergency care. For example, call if:    · You have sudden chest pain and shortness of breath, or you cough up blood.     · Your leg is cool or pale or changes color.    Call your doctor now or seek immediate medical care if:    · You have increasing or severe pain.     · Your leg suddenly feels weak and you cannot move it.     · You have signs of a blood clot, such as:  ? Pain in your calf, back of the knee, thigh, or groin. ? Redness and swelling in your leg or groin.     · You have signs of infection, such as:  ? Increased pain, swelling, warmth, or redness. ? Red streaks leading from the sore area. ? Pus draining from a place on your leg. ? A fever.     · You cannot bear weight on your leg.    Watch closely for changes in your health, and be sure to contact your doctor if:    · You do not get better as expected. Where can you learn more? Go to http://rolanda-jaya.info/. Enter P658 in the search box to learn more about \"Leg Pain: Care Instructions. \"  Current as of: June 26, 2019  Content Version: 12.2  © 4055-5624 [employer]. Care instructions adapted under license by [employer] (which disclaims liability or warranty for this information). If you have questions about a medical condition or this instruction, always ask your healthcare professional. [place] [address] any warranty or liability for your use of this information.

## 2020-03-20 ENCOUNTER — HOSPITAL ENCOUNTER (OUTPATIENT)
Age: 85
Setting detail: OUTPATIENT SURGERY
Discharge: HOME | End: 2020-03-20
Attending: INTERNAL MEDICINE | Admitting: INTERNAL MEDICINE
Payer: MEDICARE

## 2020-03-20 ENCOUNTER — ANESTHESIA EVENT (OUTPATIENT)
Dept: CARDIAC CATH/INVASIVE PROCEDURES | Age: 85
End: 2020-03-20
Payer: MEDICARE

## 2020-03-20 ENCOUNTER — ANESTHESIA (OUTPATIENT)
Dept: CARDIAC CATH/INVASIVE PROCEDURES | Age: 85
End: 2020-03-20
Payer: MEDICARE

## 2020-03-20 ENCOUNTER — APPOINTMENT (OUTPATIENT)
Dept: GENERAL RADIOLOGY | Age: 85
End: 2020-03-20
Attending: INTERNAL MEDICINE
Payer: MEDICARE

## 2020-03-20 VITALS
DIASTOLIC BLOOD PRESSURE: 65 MMHG | RESPIRATION RATE: 15 BRPM | TEMPERATURE: 98.6 F | OXYGEN SATURATION: 95 % | SYSTOLIC BLOOD PRESSURE: 127 MMHG | HEART RATE: 64 BPM

## 2020-03-20 VITALS
SYSTOLIC BLOOD PRESSURE: 144 MMHG | HEART RATE: 87 BPM | RESPIRATION RATE: 21 BRPM | OXYGEN SATURATION: 98 % | DIASTOLIC BLOOD PRESSURE: 73 MMHG

## 2020-03-20 DIAGNOSIS — I49.5 SICK SINUS SYNDROME (HCC): ICD-10-CM

## 2020-03-20 LAB — GLUCOSE BLD STRIP.AUTO-MCNC: 132 MG/DL (ref 70–110)

## 2020-03-20 PROCEDURE — 77030022017 HC DRSG HEMO QCLOT ZMED -A: Performed by: INTERNAL MEDICINE

## 2020-03-20 PROCEDURE — 77030018729 HC ELECTRD DEFIB PAD CARD -B: Performed by: INTERNAL MEDICINE

## 2020-03-20 PROCEDURE — 77030002996 HC SUT SLK J&J -A: Performed by: INTERNAL MEDICINE

## 2020-03-20 PROCEDURE — 77030012935 HC DRSG AQUACEL BMS -B: Performed by: INTERNAL MEDICINE

## 2020-03-20 PROCEDURE — C1898 LEAD, PMKR, OTHER THAN TRANS: HCPCS | Performed by: INTERNAL MEDICINE

## 2020-03-20 PROCEDURE — 77030031139 HC SUT VCRL2 J&J -A: Performed by: INTERNAL MEDICINE

## 2020-03-20 PROCEDURE — 77030019580 HC CBL PACE MEDT -B: Performed by: INTERNAL MEDICINE

## 2020-03-20 PROCEDURE — 76060000034 HC ANESTHESIA 1.5 TO 2 HR: Performed by: INTERNAL MEDICINE

## 2020-03-20 PROCEDURE — 93005 ELECTROCARDIOGRAM TRACING: CPT

## 2020-03-20 PROCEDURE — 74011250636 HC RX REV CODE- 250/636: Performed by: ANESTHESIOLOGY

## 2020-03-20 PROCEDURE — C1786 PMKR, SINGLE, RATE-RESP: HCPCS | Performed by: INTERNAL MEDICINE

## 2020-03-20 PROCEDURE — 77030018673: Performed by: INTERNAL MEDICINE

## 2020-03-20 PROCEDURE — 74011250636 HC RX REV CODE- 250/636: Performed by: INTERNAL MEDICINE

## 2020-03-20 PROCEDURE — 82962 GLUCOSE BLOOD TEST: CPT

## 2020-03-20 PROCEDURE — 74011636320 HC RX REV CODE- 636/320: Performed by: INTERNAL MEDICINE

## 2020-03-20 PROCEDURE — 71045 X-RAY EXAM CHEST 1 VIEW: CPT

## 2020-03-20 PROCEDURE — 74011636320 HC RX REV CODE- 636/320: Performed by: ANESTHESIOLOGY

## 2020-03-20 PROCEDURE — C1893 INTRO/SHEATH, FIXED,NON-PEEL: HCPCS | Performed by: INTERNAL MEDICINE

## 2020-03-20 PROCEDURE — 33207 INSERT HEART PM VENTRICULAR: CPT | Performed by: INTERNAL MEDICINE

## 2020-03-20 PROCEDURE — 74011000250 HC RX REV CODE- 250: Performed by: INTERNAL MEDICINE

## 2020-03-20 DEVICE — LEAD 407658 CAPSUREFIX NOVUS US MRI
Type: IMPLANTABLE DEVICE | Status: FUNCTIONAL
Brand: CAPSUREFIX NOVUS MRI™ SURESCAN™

## 2020-03-20 DEVICE — IPG W1SR01 AZURE XT SR MRI WL USA
Type: IMPLANTABLE DEVICE | Status: FUNCTIONAL
Brand: AZURE™ XT SR MRI SURESCAN™

## 2020-03-20 RX ORDER — CEFAZOLIN SODIUM 2 G/50ML
2 SOLUTION INTRAVENOUS ONCE
Status: COMPLETED | OUTPATIENT
Start: 2020-03-20 | End: 2020-03-20

## 2020-03-20 RX ORDER — FENTANYL CITRATE 50 UG/ML
INJECTION, SOLUTION INTRAMUSCULAR; INTRAVENOUS AS NEEDED
Status: DISCONTINUED | OUTPATIENT
Start: 2020-03-20 | End: 2020-03-20

## 2020-03-20 RX ORDER — ACETAMINOPHEN 160 MG/5ML
100 SUSPENSION, ORAL (FINAL DOSE FORM) ORAL DAILY
COMMUNITY
End: 2022-06-08

## 2020-03-20 RX ORDER — CARVEDILOL 6.25 MG/1
6.25 TABLET ORAL 2 TIMES DAILY WITH MEALS
Qty: 60 TAB | Refills: 3 | Status: SHIPPED | OUTPATIENT
Start: 2020-03-20 | End: 2020-11-19 | Stop reason: SDUPTHER

## 2020-03-20 RX ORDER — HYDROCODONE BITARTRATE AND ACETAMINOPHEN 5; 325 MG/1; MG/1
1 TABLET ORAL
Status: DISCONTINUED | OUTPATIENT
Start: 2020-03-20 | End: 2020-03-20 | Stop reason: HOSPADM

## 2020-03-20 RX ORDER — NITROFURANTOIN 25; 75 MG/1; MG/1
100 CAPSULE ORAL 2 TIMES DAILY
COMMUNITY
End: 2022-06-08

## 2020-03-20 RX ORDER — ONDANSETRON 2 MG/ML
4 INJECTION INTRAMUSCULAR; INTRAVENOUS
Status: DISCONTINUED | OUTPATIENT
Start: 2020-03-20 | End: 2020-03-20 | Stop reason: HOSPADM

## 2020-03-20 RX ORDER — INSULIN LISPRO 100 [IU]/ML
INJECTION, SOLUTION INTRAVENOUS; SUBCUTANEOUS ONCE
Status: DISCONTINUED | OUTPATIENT
Start: 2020-03-20 | End: 2020-03-20 | Stop reason: HOSPADM

## 2020-03-20 RX ORDER — ACETAMINOPHEN 325 MG/1
650 TABLET ORAL
Status: DISCONTINUED | OUTPATIENT
Start: 2020-03-20 | End: 2020-03-20 | Stop reason: HOSPADM

## 2020-03-20 RX ORDER — FENTANYL CITRATE 50 UG/ML
INJECTION, SOLUTION INTRAMUSCULAR; INTRAVENOUS AS NEEDED
Status: DISCONTINUED | OUTPATIENT
Start: 2020-03-20 | End: 2020-03-20 | Stop reason: HOSPADM

## 2020-03-20 RX ORDER — SODIUM CHLORIDE 0.9 % (FLUSH) 0.9 %
5-40 SYRINGE (ML) INJECTION EVERY 8 HOURS
Status: DISCONTINUED | OUTPATIENT
Start: 2020-03-20 | End: 2020-03-20 | Stop reason: HOSPADM

## 2020-03-20 RX ORDER — SODIUM CHLORIDE 0.9 % (FLUSH) 0.9 %
5-40 SYRINGE (ML) INJECTION AS NEEDED
Status: DISCONTINUED | OUTPATIENT
Start: 2020-03-20 | End: 2020-03-20 | Stop reason: HOSPADM

## 2020-03-20 RX ORDER — LIDOCAINE HYDROCHLORIDE 10 MG/ML
INJECTION, SOLUTION EPIDURAL; INFILTRATION; INTRACAUDAL; PERINEURAL AS NEEDED
Status: DISCONTINUED | OUTPATIENT
Start: 2020-03-20 | End: 2020-03-20 | Stop reason: HOSPADM

## 2020-03-20 RX ORDER — CARVEDILOL 6.25 MG/1
6.25 TABLET ORAL 2 TIMES DAILY WITH MEALS
Status: DISCONTINUED | OUTPATIENT
Start: 2020-03-20 | End: 2020-03-20 | Stop reason: HOSPADM

## 2020-03-20 RX ORDER — ACETAMINOPHEN 325 MG/1
650 TABLET ORAL
Qty: 20 TAB | Refills: 0 | Status: SHIPPED | OUTPATIENT
Start: 2020-03-20

## 2020-03-20 RX ORDER — HYDRALAZINE HYDROCHLORIDE 20 MG/ML
INJECTION INTRAMUSCULAR; INTRAVENOUS AS NEEDED
Status: DISCONTINUED | OUTPATIENT
Start: 2020-03-20 | End: 2020-03-20 | Stop reason: HOSPADM

## 2020-03-20 RX ORDER — SODIUM CHLORIDE 9 MG/ML
25 INJECTION, SOLUTION INTRAVENOUS CONTINUOUS
Status: DISCONTINUED | OUTPATIENT
Start: 2020-03-20 | End: 2020-03-20 | Stop reason: HOSPADM

## 2020-03-20 RX ORDER — MAGNESIUM SULFATE 100 %
4 CRYSTALS MISCELLANEOUS AS NEEDED
Status: DISCONTINUED | OUTPATIENT
Start: 2020-03-20 | End: 2020-03-20 | Stop reason: HOSPADM

## 2020-03-20 RX ORDER — MIDAZOLAM HYDROCHLORIDE 1 MG/ML
INJECTION, SOLUTION INTRAMUSCULAR; INTRAVENOUS AS NEEDED
Status: DISCONTINUED | OUTPATIENT
Start: 2020-03-20 | End: 2020-03-20 | Stop reason: HOSPADM

## 2020-03-20 RX ORDER — PROPOFOL 10 MG/ML
INJECTION, EMULSION INTRAVENOUS AS NEEDED
Status: DISCONTINUED | OUTPATIENT
Start: 2020-03-20 | End: 2020-03-20 | Stop reason: HOSPADM

## 2020-03-20 RX ORDER — DEXTROSE 50 % IN WATER (D50W) INTRAVENOUS SYRINGE
25-50 AS NEEDED
Status: DISCONTINUED | OUTPATIENT
Start: 2020-03-20 | End: 2020-03-20 | Stop reason: HOSPADM

## 2020-03-20 RX ADMIN — PROPOFOL 20 MG: 10 INJECTION, EMULSION INTRAVENOUS at 12:01

## 2020-03-20 RX ADMIN — IOPAMIDOL 15 ML: 755 INJECTION, SOLUTION INTRAVENOUS at 11:28

## 2020-03-20 RX ADMIN — FENTANYL CITRATE 50 MCG: 50 INJECTION, SOLUTION INTRAMUSCULAR; INTRAVENOUS at 11:56

## 2020-03-20 RX ADMIN — MIDAZOLAM HYDROCHLORIDE 2 MG: 2 INJECTION, SOLUTION INTRAMUSCULAR; INTRAVENOUS at 11:02

## 2020-03-20 RX ADMIN — CEFAZOLIN 2 G: 10 INJECTION, POWDER, FOR SOLUTION INTRAVENOUS at 10:59

## 2020-03-20 RX ADMIN — HYDRALAZINE HYDROCHLORIDE 10 MG: 20 INJECTION INTRAMUSCULAR; INTRAVENOUS at 11:58

## 2020-03-20 RX ADMIN — FENTANYL CITRATE 50 MCG: 50 INJECTION, SOLUTION INTRAMUSCULAR; INTRAVENOUS at 11:01

## 2020-03-20 NOTE — ANESTHESIA PREPROCEDURE EVALUATION
Anesthetic History   No history of anesthetic complications            Review of Systems / Medical History  Patient summary reviewed, nursing notes reviewed and pertinent labs reviewed    Pulmonary            Asthma : well controlled       Neuro/Psych       CVA       Cardiovascular    Hypertension: well controlled        Dysrhythmias : atrial fibrillation  CAD    Exercise tolerance: >4 METS     GI/Hepatic/Renal                Endo/Other    Diabetes: well controlled, type 2  Hypothyroidism: well controlled  Obesity     Other Findings              Physical Exam    Airway  Mallampati: II  TM Distance: 4 - 6 cm  Neck ROM: decreased range of motion   Mouth opening: Normal     Cardiovascular    Rhythm: irregular           Dental    Dentition: Full lower dentures and Full upper dentures     Pulmonary  Breath sounds clear to auscultation               Abdominal  GI exam deferred       Other Findings            Anesthetic Plan    ASA: 3, emergent  Anesthesia type: MAC          Induction: Intravenous  Anesthetic plan and risks discussed with: Patient and Family

## 2020-03-20 NOTE — Clinical Note
TRANSFER - IN REPORT:     Verbal report received from: [facility] RN. Report consisted of patient's Situation, Background, Assessment and   Recommendations(SBAR). Opportunity for questions and clarification was provided. Assessment completed upon patient's arrival to unit and care assumed. Patient transported with a Cardiac Cath Tech / Patient Care Tech.

## 2020-03-20 NOTE — Clinical Note
TRANSFER - OUT REPORT:     [facility]. Report consisted of patient's Situation, Background, Assessment and   Recommendations(SBAR). Opportunity for questions and clarification was provided.

## 2020-03-20 NOTE — H&P
HPI    Aletha Jones is a 80 y. o. with h/o persistent AFib, CAD s/p PCI LAD 2005, HTN, DM2, Dyslipidemia, h/o TIA here to reestablish her cardiovascular care after a syncopal episode.     This is a previous patient of our practice but who relocated-had been living in [place] for the last several years. She comes with her daughter today as they have just recently moved back to [place] and patient will be placed in an assisted living. I was able to obtain records- please see media and pt having HRs as low as 39 with such severe fatigue she can barely make the bed.     Apparently fell last year as a result of a stroke but unfortunately brain bleed because of the fall. She had been on Eliquis at the time for her atrial fibrillation. The Eliquis was stopped-she has not been on it since. She has suffered with UTIs frequent falls again which many of these falls were not witnessed. It is not clear if she truly lost consciousness regardless she was hospitalized many times and this happened.       Previously been on Eliquis, hydralazine, hydrochlorothiazide, metoprolol and Lasix. Since daughter says her heart rate was in the 20s at night and so her metoprolol was stopped but still the heart rates could go down into the 40s. Also having low blood pressures and problems with potassium so essentially all of her medications were stopped-and she was placed on a 24-hour Holter monitor. 2 days before they had plan to move back here to [place] she says the cardiologist called her and told her that her mom needs a pacemaker. They were already moving he said it was okay for her to establish with somebody here as an urgent need to do it right away.     Luckily she has not passed out again and not had any recent problems. No swelling, chest pain or shortness of breath or recurrent syncope.   Does complain of being tired and weak-should be noted that she is gone through quite a bit of rehab and physical therapy and her functional status is greatly improved to where it had been after her last stroke. The left side of her body is generally weaker from her stroke and she feels her left hand is \"puffy\" today but otherwise no new changes. No recent infections etc. Still so tired, she cant make her bed.          Past Medical History:   Diagnosis Date    Adrenal [facility] [address] [address]       benign on serial f/u by CT    Allergic rhinitis 10/30/2012    Atrial fibrillation ([address] 1/14/2011    CAD (coronary artery disease), native coronary artery      Calculus of kidney       Dr. Lazaro Edgar [facility])       left breast carcinoma    Coronary stent 9/2005     drug eluting stent to proximal LAD    Diabetes mellitus ([facility] [address]       microalbuminuria    Dyslipidemia      History of echocardiogram 11/30/2009     Tech diff. A-Fib. EF 60-65%. Mild conc LVH. Mild LAE. Mild MR. Mod RVE. Mod TR.  RVSP 45-50.  History of myocardial perfusion scan 08/12/2013     No ischemia or prior infarction. Very sm area of apical thinning. Hyperdynamic LV function. EF >80%. Equivocally positive EKG on pharm stress test.  Low risk.  Hypertension      Osteopenia       DEXA -1.4 spine, -1.5 hip w FRAX 11 and 2.2 from 6/11    Pulmonary hypertension, secondary ([facility] [address]      S/P cardiac cath 08/29/2005     LM normal.  mLAD 100%. mCx 25%. PDA 30%. RCA mild. EF 60-65%.   AA mildly dilated.                Past Surgical History:   Procedure Laterality Date    CARDIAC SURG PROCEDURE UNLIST   9/1/2005     coronary angiography stent deplyoment    CARDIAC SURG PROCEDURE UNLIST   8/13     thallium negative ef 80%    HX CORONARY STENT PLACEMENT        HX GI   03/20/08     Laproscopic low anterior resection Dr. Luis E Devlin   02/2007     left modified     HX UROLOGICAL   6/2/2016     [facility], Dr. Nance [place], Cystoscopy, retrograde ureteroscopy, double-J catheter    CT COLONOSCOPY FLX DX W/COLLJ SPEC WHEN PFRMD   2008     s/p [facility] for adenoma Dr. Laird [place]                Current Outpatient Medications   Medication Sig Dispense Refill    levothyroxine (SYNTHROID) 88 mcg tablet Take  by mouth Daily (before breakfast).        loratadine (CLARITIN) 10 mg tablet Take 10 mg by mouth two (2) times a day.        traZODone (DESYREL) 50 mg tablet Take  by mouth nightly.        oxybutynin (DITROPAN) 5 mg tablet Take 5 mg by mouth two (2) times a day.        folic acid (FOLVITE) 1 mg tablet Take 1 Tab by mouth daily. 30 Tab 0    hydrALAZINE (APRESOLINE) 10 mg tablet Take 1 Tab by mouth three (3) times daily. 90 Tab 0    OTHER This is to certify that Sylvia Bob was admitted to [facility] from 5/30/2016 to 6/15/16.      Please feel free to contact my office if you have any questions or concerns.       Thank you. 1 Each 0    HYDROcodone-acetaminophen (NORCO) 5-325 mg per tablet Take 1 Tab by mouth every eight (8) hours as needed for Pain. Max Daily Amount: 3 Tabs. 12 Tab 0    OTHER Incentive Spirometry- use as directed 1 Each 0    escitalopram oxalate (LEXAPRO) 10 mg tablet Take 10 mg by mouth daily as needed.        atorvastatin (LIPITOR) 40 mg tablet Take 1 Tab by mouth daily. 90 Tab 3    fluticasone (FLONASE) 50 mcg/actuation nasal spray 2 sprays each nostril daily 1 Bottle 12    pantoprazole (PROTONIX) 40 mg tablet Take 1 Tab by mouth daily.  90 Tab 3    budesonide-formoterol (SYMBICORT) 160-4.5 mcg/Actuation HFA inhaler Take 2 Puffs by inhalation as needed.        albuterol (VENTOLIN HFA) 90 mcg/Actuation inhaler Take 1 Puff by inhalation as needed.                   Allergies   Allergen Reactions    Amiodarone Other (comments)       hallucinations    Codeine Unable to Obtain    Sulfa (Sulfonamide Antibiotics) Unable to Obtain         Social History            Socioeconomic History    Marital status:        Spouse name: Not on file    Number of children: 6    Years of education: Not on file    Highest education level: Not on file   Occupational History    Occupation: retired    Social Needs    Financial resource strain: Not on file    Food insecurity:       Worry: Not on file       Inability: Not on file    Transportation needs:       Medical: Not on file       Non-medical: Not on file   Tobacco Use    Smoking status: Never Smoker    Smokeless tobacco: Never Used   Substance and Sexual Activity    Alcohol use: No    Drug use: No    Sexual activity: Not on file   Lifestyle    Physical activity:       Days per week: Not on file       Minutes per session: Not on file    Stress: Not on file   Relationships    Social connections:       Talks on phone: Not on file       Gets together: Not on file       Attends [employer] service: Not on file       Active member of club or organization: Not on file       Attends meetings of clubs or organizations: Not on file       Relationship status: Not on file    Intimate partner violence:       Fear of current or ex partner: Not on file       Emotionally abused: Not on file       Physically abused: Not on file       Forced sexual activity: Not on file   Other Topics Concern    Not on file   Social History Narrative    Not on file         FH: n/a     Review of Systems    14 pt Review of Systems is negative unless otherwise mentioned in the HPI.         Wt Readings from Last 3 Encounters:   11/22/19 83.5 kg (184 lb)   06/22/16 77.1 kg (170 lb)   06/15/16 77.2 kg (170 lb 3.2 oz)          Temp Readings from Last 3 Encounters:   06/22/16 98.4 °F (36.9 °C)   06/15/16 98.2 °F (36.8 °C)   08/12/15 98.6 °F (37 °C)      BP Readings from Last 3 Encounters:   11/22/19 162/88   06/23/16 102/47   06/15/16 112/60          Pulse Readings from Last 3 Encounters:   11/22/19 76   06/23/16 (!) 48   06/15/16 (!) 56               Physical Exam:    Visit Vitals  Ht 5' 4\" (1.626 m)   BMI 31.58 kg/m²      Physical Exam  Vitals signs reviewed. HENT:      Head: Normocephalic and atraumatic. Eyes:      Pupils: Pupils are equal, round, and reactive to light. Cardiovascular:      Rate and Rhythm: Normal rate and regular rhythm. Heart sounds: Normal heart sounds. No murmur. No friction rub. No gallop. Pulmonary:      Effort: Pulmonary effort is normal. No respiratory distress. Breath sounds: Normal breath sounds. No wheezing or rales. Chest:      Chest wall: No tenderness. Abdominal:      General: Bowel sounds are normal.      Palpations: Abdomen is soft. Musculoskeletal:         General: No tenderness. Skin:     General: Skin is warm and dry. Neurological:      Mental Status: She is alert and oriented to person, place, and time.          EKG: Atrial fibrillation, normal axis and intervals, no ST segment abnormalities     Impression and Plan:  Bea Meehan is a 80 y. o. with:     1.)  Longstanding persistent AF with evidence of irreversible sick sinus syndrome (HRs dropping to 39 with prolonged pauses greater than 3.5 seconds on a Holter monitor. Patient is currently not on any rate slowing agents. Her anticoagulation was stopped last year after she fell and suffered a subarachnoid bleed. She would benefit from the addition of a beta-blocker given her history of coronary artery disease. 2.) CAD s/p PCI LAD 05'  3.) TIA/ CVA with brain bleed s/p fall  4.) DM2  5.) HTN  6.) Dyslipidemia  7.) Normal LV function echo 12/2019     We will proceed with implantation of single-chamber permanent pacemaker today as scheduled. Discussed at length with patient. Labs reviewed. All questions answered.

## 2020-03-20 NOTE — ROUTINE PROCESS
A+Ox4, denied any complaints, ambulatory with assistance as when received. Left upper chest dressing intact, no bleeding or swelling. Safety sling applied. Discharge information reviewed with the patient and her daughter and care taker. Escorted to car, tot her daughter for transport back to assisted care facility.

## 2020-03-21 LAB
ATRIAL RATE: 76 BPM
CALCULATED R AXIS, ECG10: 35 DEGREES
CALCULATED T AXIS, ECG11: -26 DEGREES
DIAGNOSIS, 93000: NORMAL
Q-T INTERVAL, ECG07: 436 MS
QRS DURATION, ECG06: 130 MS
QTC CALCULATION (BEZET), ECG08: 486 MS
VENTRICULAR RATE, ECG03: 75 BPM

## 2020-03-22 NOTE — ANESTHESIA POSTPROCEDURE EVALUATION
Procedure(s): Insert Ppm Single Ventricular. MAC    Anesthesia Post Evaluation      Multimodal analgesia: multimodal analgesia used between 6 hours prior to anesthesia start to PACU discharge  Patient location during evaluation: bedside  Patient participation: complete - patient participated  Level of consciousness: awake  Pain score: 0  Pain management: adequate  Airway patency: patent  Anesthetic complications: no  Cardiovascular status: stable  Respiratory status: acceptable  Hydration status: acceptable  Post anesthesia nausea and vomiting:  none      No vitals data found for the desired time range.

## 2020-05-27 ENCOUNTER — OFFICE VISIT (OUTPATIENT)
Dept: CARDIOLOGY CLINIC | Age: 85
End: 2020-05-27

## 2020-05-27 VITALS
BODY MASS INDEX: 33.8 KG/M2 | DIASTOLIC BLOOD PRESSURE: 92 MMHG | WEIGHT: 198 LBS | OXYGEN SATURATION: 98 % | SYSTOLIC BLOOD PRESSURE: 150 MMHG | HEART RATE: 78 BPM | HEIGHT: 64 IN

## 2020-05-27 DIAGNOSIS — I49.5 SICK SINUS SYNDROME (HCC): ICD-10-CM

## 2020-05-27 DIAGNOSIS — I48.91 ATRIAL FIBRILLATION, UNSPECIFIED TYPE (HCC): Primary | ICD-10-CM

## 2020-05-27 NOTE — PROGRESS NOTES
Jamey Dear Simba    \"Needs a pacemaker\"    HPI    Murl Cogan is a 80 y. o. with h/o persistent AFib, CAD s/p PCI LAD 2005, HTN, DM2, Dyslipidemia, h/o TIA here to reestablish her cardiovascular care after a syncopal episode. This is a previous patient of our practice but who relocated-had been living in [place] for the last several years. She comes with her daughter today as they have just recently moved back to [place] and patient will be placed in an assisted living. I was able to obtain records- please see media and pt having HRs as low as 39 with such severe fatigue she can barely make the bed. Apparently fell last year as a result of a stroke but unfortunately brain bleed because of the fall. She had been on Eliquis at the time for her atrial fibrillation. The Eliquis was stopped-she has not been on it since. She has suffered with UTIs frequent falls again which many of these falls were not witnessed. It is not clear if she truly lost consciousness regardless she was hospitalized many times and this happened. Previously been on Eliquis, hydralazine, hydrochlorothiazide, metoprolol and Lasix. Since daughter says her heart rate was in the 20s at night and so her metoprolol was stopped but still the heart rates could go down into the 40s. Also having low blood pressures and problems with potassium so essentially all of her medications were stopped-and she was placed on a 24-hour Holter monitor. 2 days before they had plan to move back here to [place] she says the cardiologist called her and told her that her mom needs a pacemaker. They were already moving he said it was okay for her to establish with somebody here as an urgent need to do it right away. Luckily she has not passed out again and not had any recent problems. No swelling, chest pain or shortness of breath or recurrent syncope.   Does complain of being tired and weak-should be noted that she is gone through quite a bit of rehab and physical therapy and her functional status is greatly improved to where it had been after her last stroke. The left side of her body is generally weaker from her stroke and she feels her left hand is \"puffy\" today but otherwise no new changes. No recent infections etc. Still so tired, she cant make her bed. I sent her for PPM- went well no issues. No complaints today. She is being treated for a UTI, less activity due to COVID-19 lives in an assisted living. Past Medical History:   Diagnosis Date    Adrenal mass ([facility] [address]     benign on serial f/u by CT    Allergic rhinitis 10/30/2012    Atrial fibrillation ([address] 1/14/2011    CAD (coronary artery disease), native coronary artery     Calculus of kidney     Dr. Karin Coats [facility])     left breast carcinoma    Coronary stent 9/2005    drug eluting stent to proximal LAD    Diabetes mellitus ([facility] [address]     microalbuminuria    Dyslipidemia     History of echocardiogram 11/30/2009    Tech diff. A-Fib. EF 60-65%. Mild conc LVH. Mild LAE. Mild MR. Mod RVE. Mod TR.  RVSP 45-50.  History of myocardial perfusion scan 08/12/2013    No ischemia or prior infarction. Very sm area of apical thinning. Hyperdynamic LV function. EF >80%. Equivocally positive EKG on pharm stress test.  Low risk.  Hypertension     Osteopenia     DEXA -1.4 spine, -1.5 hip w FRAX 11 and 2.2 from 6/11    Pulmonary hypertension, secondary     S/P cardiac cath 08/29/2005    LM normal.  mLAD 100%. mCx 25%. PDA 30%. RCA mild. EF 60-65%. AA mildly dilated.         Past Surgical History:   Procedure Laterality Date    CARDIAC SURG PROCEDURE UNLIST  9/1/2005    coronary angiography stent deplyoment    CARDIAC SURG PROCEDURE UNLIST  8/13    thallium negative ef 80%    HX CORONARY STENT PLACEMENT      HX GI  03/20/08    Laproscopic low anterior resection Dr. Julius Zapata  02/2007    left modified     HX UROLOGICAL  6/2/2016    [facility], Dr. Merritt [place], Cystoscopy, retrograde ureteroscopy, double-J catheter    NE COLONOSCOPY FLX DX W/COLLJ [facility] WHEN PFRMD  2008    s/[facility] W/TRANSV ELTRD VENTR Left 3/20/2020    Insert Ppm Single Ventricular performed by Joon Santamaria MD at [facility] CATH LAB       Current Outpatient Medications   Medication Sig Dispense Refill    nitrofurantoin, macrocrystal-monohydrate, (Macrobid) 100 mg capsule Take 100 mg by mouth two (2) times a day.  coenzyme Q-10 (Co Q-10) 200 mg capsule Take 100 mg by mouth daily.  acetaminophen (TYLENOL) 325 mg tablet Take 2 Tabs by mouth every four (4) hours as needed for Pain. 20 Tab 0    carvediloL (COREG) 6.25 mg tablet Take 1 Tab by mouth two (2) times daily (with meals). Indications: high blood pressure, ventricular rate control in atrial fibrillation 60 Tab 3    levothyroxine (SYNTHROID) 88 mcg tablet Take  by mouth Daily (before breakfast).  loratadine (CLARITIN) 10 mg tablet Take 10 mg by mouth two (2) times a day.  traZODone (DESYREL) 50 mg tablet Take  by mouth nightly.  oxybutynin (DITROPAN) 5 mg tablet Take 5 mg by mouth two (2) times a day.  folic acid (FOLVITE) 1 mg tablet Take 1 Tab by mouth daily. 30 Tab 0    OTHER Incentive Spirometry- use as directed 1 Each 0    escitalopram oxalate (LEXAPRO) 10 mg tablet Take 10 mg by mouth daily as needed.  atorvastatin (LIPITOR) 40 mg tablet Take 1 Tab by mouth daily. 90 Tab 3    fluticasone (FLONASE) 50 mcg/actuation nasal spray 2 sprays each nostril daily 1 Bottle 12    pantoprazole (PROTONIX) 40 mg tablet Take 1 Tab by mouth daily. 90 Tab 3    budesonide-formoterol (SYMBICORT) 160-4.5 mcg/Actuation HFA inhaler Take 2 Puffs by inhalation as needed.  albuterol (VENTOLIN HFA) 90 mcg/Actuation inhaler Take 1 Puff by inhalation as needed.          Allergies   Allergen Reactions    Amiodarone Other (comments)     hallucinations    Codeine Unable to Obtain    Sulfa (Sulfonamide Antibiotics) Unable to Obtain       Social History     Socioeconomic History    Marital status:      Spouse name: Not on file    Number of children: 6    Years of education: Not on file    Highest education level: Not on file   Occupational History    Occupation: retired    Social Needs    Financial resource strain: Not on file    Food insecurity     Worry: Not on file     Inability: Not on file   [employer] needs     Medical: Not on file     Non-medical: Not on file   Tobacco Use    Smoking status: Never Smoker    Smokeless tobacco: Never Used   Substance and Sexual Activity    Alcohol use: No    Drug use: No    Sexual activity: Not on file   Lifestyle    Physical activity     Days per week: Not on file     Minutes per session: Not on file    Stress: Not on file   Relationships    Social connections     Talks on phone: Not on file     Gets together: Not on file     Attends [employer] service: Not on file     Active member of club or organization: Not on file     Attends meetings of clubs or organizations: Not on file     Relationship status: Not on file    Intimate partner violence     Fear of current or ex partner: Not on file     Emotionally abused: Not on file     Physically abused: Not on file     Forced sexual activity: Not on file   Other Topics Concern    Not on file   Social History Narrative    Not on file        FH: n/a    Review of Systems    14 pt Review of Systems is negative unless otherwise mentioned in the HPI.     Wt Readings from Last 3 Encounters:   03/11/20 87.1 kg (192 lb)   02/26/20 87.5 kg (193 lb)   11/22/19 83.5 kg (184 lb)     Temp Readings from Last 3 Encounters:   03/20/20 98.6 °F (37 °C)   03/12/20 97.1 °F (36.2 °C)   06/22/16 98.4 °F (36.9 °C)     BP Readings from Last 3 Encounters:   03/20/20 127/65   03/20/20 144/73   03/12/20 135/68     Pulse Readings from Last 3 Encounters:   03/20/20 64   03/20/20 87   03/12/20 (!) 55            Physical Exam:    There were no vitals taken for this visit. Physical Exam  Vitals signs reviewed. HENT:      Head: Normocephalic and atraumatic. Eyes:      Pupils: Pupils are equal, round, and reactive to light. Cardiovascular:      Rate and Rhythm: Normal rate and regular rhythm. Heart sounds: Normal heart sounds. No murmur. No friction rub. No gallop. Pulmonary:      Effort: Pulmonary effort is normal. No respiratory distress. Breath sounds: Normal breath sounds. No wheezing or rales. Chest:      Chest wall: No tenderness. Abdominal:      General: Bowel sounds are normal.      Palpations: Abdomen is soft. Musculoskeletal:         General: No tenderness. Skin:     General: Skin is warm and dry. Neurological:      Mental Status: She is alert and oriented to person, place, and time. EKG last: NSR, normal axis and intervals, no ST segment abnormalities    Impression and Plan:  Benjie Lux is a 80 y. o. with:    1.) pAF with likely SSS (HRs dropping to 39, see monitor scanned into media)  2.) CAD s/p PCI LAD 05'  3.) TIA/ CVA with brain bleed s/p fall  4.) DM2  5.) HTN  6.) Dyslipidemia  7.) Normal LV function echo 12/2019  8.) Current UTI, known    1.) PPM healing well, no concerns- will alert device nurse, due for transmission in June? 2.) RTC 6 months with device check  3.) No change in meds, only on Coreg for rate control, no anticoag due to bleeding risk    Thank you for allowing me to participate in the care of your patient, please do not hesitate to call with questions or concerns.     Kindest Regards,    Lola Bray, DO

## 2020-07-22 ENCOUNTER — OFFICE VISIT (OUTPATIENT)
Dept: CARDIOLOGY CLINIC | Age: 85
End: 2020-07-22

## 2020-07-22 DIAGNOSIS — Z95.0 PACEMAKER: ICD-10-CM

## 2020-07-22 DIAGNOSIS — I48.91 ATRIAL FIBRILLATION, UNSPECIFIED TYPE (HCC): ICD-10-CM

## 2020-07-22 DIAGNOSIS — I49.5 SICK SINUS SYNDROME (HCC): Primary | ICD-10-CM

## 2020-07-28 NOTE — PROGRESS NOTES
I have personally seen and evaluated the device findings. Interrogation reviewed and I agree with assessment.     Nico Knowles

## 2020-11-19 ENCOUNTER — CLINICAL SUPPORT (OUTPATIENT)
Dept: CARDIOLOGY CLINIC | Age: 85
End: 2020-11-19
Payer: MEDICARE

## 2020-11-19 ENCOUNTER — OFFICE VISIT (OUTPATIENT)
Dept: CARDIOLOGY CLINIC | Age: 85
End: 2020-11-19
Payer: MEDICARE

## 2020-11-19 VITALS
SYSTOLIC BLOOD PRESSURE: 128 MMHG | WEIGHT: 193 LBS | BODY MASS INDEX: 32.95 KG/M2 | HEIGHT: 64 IN | HEART RATE: 89 BPM | DIASTOLIC BLOOD PRESSURE: 74 MMHG | OXYGEN SATURATION: 98 %

## 2020-11-19 DIAGNOSIS — I49.5 SICK SINUS SYNDROME (HCC): ICD-10-CM

## 2020-11-19 DIAGNOSIS — I48.91 ATRIAL FIBRILLATION, UNSPECIFIED TYPE (HCC): Primary | ICD-10-CM

## 2020-11-19 DIAGNOSIS — Z95.0 PACEMAKER: ICD-10-CM

## 2020-11-19 PROCEDURE — 93279 PRGRMG DEV EVAL PM/LDLS PM: CPT | Performed by: INTERNAL MEDICINE

## 2020-11-19 PROCEDURE — G8417 CALC BMI ABV UP PARAM F/U: HCPCS | Performed by: INTERNAL MEDICINE

## 2020-11-19 PROCEDURE — 99214 OFFICE O/P EST MOD 30 MIN: CPT | Performed by: INTERNAL MEDICINE

## 2020-11-19 PROCEDURE — G8754 DIAS BP LESS 90: HCPCS | Performed by: INTERNAL MEDICINE

## 2020-11-19 PROCEDURE — G8536 NO DOC ELDER MAL SCRN: HCPCS | Performed by: INTERNAL MEDICINE

## 2020-11-19 PROCEDURE — G8510 SCR DEP NEG, NO PLAN REQD: HCPCS | Performed by: INTERNAL MEDICINE

## 2020-11-19 PROCEDURE — G8399 PT W/DXA RESULTS DOCUMENT: HCPCS | Performed by: INTERNAL MEDICINE

## 2020-11-19 PROCEDURE — G8752 SYS BP LESS 140: HCPCS | Performed by: INTERNAL MEDICINE

## 2020-11-19 PROCEDURE — G8427 DOCREV CUR MEDS BY ELIG CLIN: HCPCS | Performed by: INTERNAL MEDICINE

## 2020-11-19 RX ORDER — CARVEDILOL 6.25 MG/1
6.25 TABLET ORAL 2 TIMES DAILY WITH MEALS
Qty: 60 TAB | Refills: 3 | Status: SHIPPED | OUTPATIENT
Start: 2020-11-19

## 2020-11-19 NOTE — PROGRESS NOTES
Tomasa Cottrell    F/u PPM    HPI    Elise Loya is a 80 y.o. with h/o persistent AFib, CAD s/p PCI LAD 2005, HTN, DM2, Dyslipidemia, h/o TIA here to reestablish her cardiovascular care after a syncopal episode. This is a previous patient of our practice but who relocated-had been living in [place] for the last several years. She comes with her daughter today as they have just recently moved back to [place] and patient will be placed in an assisted living. I was able to obtain records- please see media and pt having HRs as low as 39 with such severe fatigue she can barely make the bed. Apparently fell last year as a result of a stroke but unfortunately brain bleed because of the fall. She had been on Eliquis at the time for her atrial fibrillation. The Eliquis was stopped-she has not been on it since. She has suffered with UTIs frequent falls again which many of these falls were not witnessed. It is not clear if she truly lost consciousness regardless she was hospitalized many times and this happened. Previously been on Eliquis, hydralazine, hydrochlorothiazide, metoprolol and Lasix. Since daughter says her heart rate was in the 20s at night and so her metoprolol was stopped but still the heart rates could go down into the 40s. Also having low blood pressures and problems with potassium so essentially all of her medications were stopped-and she was placed on a 24-hour Holter monitor. 2 days before they had plan to move back here to [place] she says the cardiologist called her and told her that her mom needs a pacemaker. They were already moving he said it was okay for her to establish with somebody here as an urgent need to do it right away. Luckily she has not passed out again and not had any recent problems. No swelling, chest pain or shortness of breath or recurrent syncope.   Does complain of being tired and weak-should be noted that she is gone through quite a bit of rehab and physical therapy and her functional status is greatly improved to where it had been after her last stroke. The left side of her body is generally weaker from her stroke and she feels her left hand is \"puffy\" today but otherwise no new changes. No recent infections etc. Still so tired, she cant make her bed. I sent her for PPM- went well no issues. No complaints today. She is being treated for a UTI, less activity due to COVID-19 lives in an assisted living. Does mention restarted Metformin and having loose stool almost once a day in the late morning. Past Medical History:   Diagnosis Date    Adrenal mass ([facility] [address]     benign on serial f/u by CT    Allergic rhinitis 10/30/2012    Atrial fibrillation ([facility] [address] 2011    CAD (coronary artery disease), native coronary artery     Calculus of kidney     Dr. Rice Duty [facility])     left breast carcinoma    Coronary stent 2005    drug eluting stent to proximal LAD    Diabetes mellitus ([facility] [address]     microalbuminuria    Dyslipidemia     History of echocardiogram 2009    Tech diff. A-Fib. EF 60-65%. Mild conc LVH. Mild LAE. Mild MR. Mod RVE. Mod TR.  RVSP 45-50.  History of myocardial perfusion scan 2013    No ischemia or prior infarction. Very sm area of apical thinning. Hyperdynamic LV function. EF >80%. Equivocally positive EKG on pharm stress test.  Low risk.  Hypertension     Osteopenia     DEXA -1.4 spine, -1.5 hip w FRAX 11 and 2.2 from     Pulmonary hypertension, secondary     S/P cardiac cath 2005    LM normal.  mLAD 100%. mCx 25%. PDA 30%. RCA mild. EF 60-65%. AA mildly dilated.         Past Surgical History:   Procedure Laterality Date    CARDIAC SURG PROCEDURE UNLIST  2005    coronary angiography stent deplyoment    CARDIAC SURG PROCEDURE UNLIST      thallium negative ef 80%    HX CORONARY STENT PLACEMENT      HX GI  08    Laproscopic low anterior resection Dr. Rajan Oconnor MASTECTOMY  02/2007    left modified     HX UROLOGICAL  6/2/2016    [address], Dr. Pilar Kwon, Cystoscopy, retrograde ureteroscopy, double-J catheter    NC COLONOSCOPY FLX DX W/COLLJ [facility] WHEN PFRMD  2008    s/p [facility] for adenoma Dr. Rafi Loza INS NEW/RPLC [address] [facility] W/TRANSV ELTRD VENTR Left 3/20/2020    Insert Ppm Single Ventricular performed by Jess Small MD at [facility] CATH LAB       Current Outpatient Medications   Medication Sig Dispense Refill    nitrofurantoin, macrocrystal-monohydrate, (Macrobid) 100 mg capsule Take 100 mg by mouth two (2) times a day.  coenzyme Q-10 (Co Q-10) 200 mg capsule Take 100 mg by mouth daily.  acetaminophen (TYLENOL) 325 mg tablet Take 2 Tabs by mouth every four (4) hours as needed for Pain. 20 Tab 0    carvediloL (COREG) 6.25 mg tablet Take 1 Tab by mouth two (2) times daily (with meals). Indications: high blood pressure, ventricular rate control in atrial fibrillation 60 Tab 3    levothyroxine (SYNTHROID) 88 mcg tablet Take  by mouth Daily (before breakfast).  loratadine (CLARITIN) 10 mg tablet Take 10 mg by mouth two (2) times a day.  traZODone (DESYREL) 50 mg tablet Take  by mouth nightly.  oxybutynin (DITROPAN) 5 mg tablet Take 5 mg by mouth two (2) times a day.  folic acid (FOLVITE) 1 mg tablet Take 1 Tab by mouth daily. 30 Tab 0    OTHER Incentive Spirometry- use as directed 1 Each 0    escitalopram oxalate (LEXAPRO) 10 mg tablet Take 10 mg by mouth daily as needed.  atorvastatin (LIPITOR) 40 mg tablet Take 1 Tab by mouth daily. 90 Tab 3    fluticasone (FLONASE) 50 mcg/actuation nasal spray 2 sprays each nostril daily 1 Bottle 12    pantoprazole (PROTONIX) 40 mg tablet Take 1 Tab by mouth daily. 90 Tab 3    budesonide-formoterol (SYMBICORT) 160-4.5 mcg/Actuation HFA inhaler Take 2 Puffs by inhalation as needed.  albuterol (VENTOLIN HFA) 90 mcg/Actuation inhaler Take 1 Puff by inhalation as needed. Allergies   Allergen Reactions    Amiodarone Other (comments)     hallucinations    Codeine Unable to Obtain    Sulfa (Sulfonamide Antibiotics) Unable to Obtain       Social History     Socioeconomic History    Marital status:      Spouse name: Not on file    Number of children: 6    Years of education: Not on file    Highest education level: Not on file   Occupational History    Occupation: retired    Social Needs    Financial resource strain: Not on file    Food insecurity     Worry: Not on file     Inability: Not on file   [employer] needs     Medical: Not on file     Non-medical: Not on file   Tobacco Use    Smoking status: Never Smoker    Smokeless tobacco: Never Used   Substance and Sexual Activity    Alcohol use: No    Drug use: No    Sexual activity: Not on file   Lifestyle    Physical activity     Days per week: Not on file     Minutes per session: Not on file    Stress: Not on file   Relationships    Social connections     Talks on phone: Not on file     Gets together: Not on file     Attends Adventism service: Not on file     Active member of club or organization: Not on file     Attends meetings of clubs or organizations: Not on file     Relationship status: Not on file    Intimate partner violence     Fear of current or ex partner: Not on file     Emotionally abused: Not on file     Physically abused: Not on file     Forced sexual activity: Not on file   Other Topics Concern    Not on file   Social History Narrative    Not on file        FH: n/a    Review of Systems    14 pt Review of Systems is negative unless otherwise mentioned in the HPI.     Wt Readings from Last 3 Encounters:   11/19/20 87.5 kg (193 lb)   05/27/20 89.8 kg (198 lb)   03/11/20 87.1 kg (192 lb)     Temp Readings from Last 3 Encounters:   03/20/20 98.6 °F (37 °C)   03/12/20 97.1 °F (36.2 °C)   06/22/16 98.4 °F (36.9 °C)     BP Readings from Last 3 Encounters:   11/19/20 128/74   05/27/20 (!) 150/92   03/20/20 127/65     Pulse Readings from Last 3 Encounters:   11/19/20 89   05/27/20 78   03/20/20 64     Physical Exam:    Visit Vitals  /74 (BP 1 Location: Left arm, BP Patient Position: Sitting)   Pulse 89   Ht 5' 4\" (1.626 m)   Wt 87.5 kg (193 lb)   SpO2 98%   BMI 33.13 kg/m²      Physical Exam  Vitals signs reviewed. HENT:      Head: Normocephalic and atraumatic. Eyes:      Pupils: Pupils are equal, round, and reactive to light. Cardiovascular:      Rate and Rhythm: Normal rate and regular rhythm. Heart sounds: Normal heart sounds. No murmur. No friction rub. No gallop. Pulmonary:      Effort: Pulmonary effort is normal. No respiratory distress. Breath sounds: Normal breath sounds. No wheezing or rales. Chest:      Chest wall: No tenderness. Abdominal:      General: Bowel sounds are normal.      Palpations: Abdomen is soft. Musculoskeletal:         General: No tenderness. Skin:     General: Skin is warm and dry. Neurological:      Mental Status: She is alert and oriented to person, place, and time. Device check WNL    Impression and Plan:  Yesi Booth is a 80 y.o. with:    1.) pAF with SSS s/p PPM  2.) CAD s/p PCI LAD 05'  3.) TIA/ CVA with brain bleed s/p fall  4.) DM2  5.) HTN  6.) Dyslipidemia  7.) Normal LV function echo 12/2019  8.) Recurrent UTI, known    RTC 6 months with device check  No change in meds, only on Coreg for rate control, no anticoag due to bleeding risk    Thank you for allowing me to participate in the care of your patient, please do not hesitate to call with questions or concerns.     Kindest Regards,    Ana Franz, DO

## 2020-11-19 NOTE — PROGRESS NOTES
Honey Roman presents today for   Chief Complaint   Patient presents with    Follow-up     6 month follow up with device check        Honeyflo Casanovaley preferred language for health care discussion is english/other. Is someone accompanying this pt? no    Is the patient using any DME equipment during [address]? no    Depression Screening:  3 most recent PHQ Screens 11/19/2020   Little interest or pleasure in doing things Not at all   Feeling down, depressed, irritable, or hopeless Not at all   Total Score PHQ 2 0       Learning Assessment:  Learning Assessment 2/11/2014   PRIMARY LEARNER Patient   HIGHEST LEVEL OF EDUCATION - PRIMARY LEARNER  DID NOT GRADUATE HIGH SCHOOL   BARRIERS PRIMARY LEARNER NONE   CO-LEARNER CAREGIVER No   PRIMARY LANGUAGE ENGLISH   LEARNER PREFERENCE PRIMARY DEMONSTRATION   ANSWERED BY patient   RELATIONSHIP SELF       Abuse Screening:  Abuse Screening Questionnaire 11/19/2020   Do you ever feel afraid of your partner? N   Are you in a relationship with someone who physically or mentally threatens you? N   Is it safe for you to go home? Y       Fall Risk  Fall Risk Assessment, last 12 mths 11/19/2020   Able to walk? Yes   Fall in past 12 months? No       Pt currently taking Anticoagulant therapy? no    Coordination of Care:  1. Have you been to the ER, urgent care clinic since your last visit? Hospitalized since your last visit? no    2. Have you seen or consulted any other health care providers outside of the [address] [place] [address] since your last visit? Include any pap smears or colon screening.  no

## 2021-05-26 ENCOUNTER — CLINICAL SUPPORT (OUTPATIENT)
Dept: CARDIOLOGY CLINIC | Age: 86
End: 2021-05-26

## 2021-05-26 ENCOUNTER — OFFICE VISIT (OUTPATIENT)
Dept: CARDIOLOGY CLINIC | Age: 86
End: 2021-05-26
Payer: MEDICARE

## 2021-05-26 VITALS
WEIGHT: 181 LBS | SYSTOLIC BLOOD PRESSURE: 140 MMHG | DIASTOLIC BLOOD PRESSURE: 90 MMHG | HEIGHT: 64 IN | OXYGEN SATURATION: 98 % | HEART RATE: 80 BPM | BODY MASS INDEX: 30.9 KG/M2

## 2021-05-26 DIAGNOSIS — Z95.0 PACEMAKER: ICD-10-CM

## 2021-05-26 DIAGNOSIS — I49.5 SICK SINUS SYNDROME (HCC): Primary | ICD-10-CM

## 2021-05-26 DIAGNOSIS — I48.91 ATRIAL FIBRILLATION, UNSPECIFIED TYPE (HCC): Primary | ICD-10-CM

## 2021-05-26 PROCEDURE — G8755 DIAS BP > OR = 90: HCPCS | Performed by: INTERNAL MEDICINE

## 2021-05-26 PROCEDURE — 1090F PRES/ABSN URINE INCON ASSESS: CPT | Performed by: INTERNAL MEDICINE

## 2021-05-26 PROCEDURE — G8399 PT W/DXA RESULTS DOCUMENT: HCPCS | Performed by: INTERNAL MEDICINE

## 2021-05-26 PROCEDURE — 93280 PM DEVICE PROGR EVAL DUAL: CPT | Performed by: INTERNAL MEDICINE

## 2021-05-26 PROCEDURE — G8536 NO DOC ELDER MAL SCRN: HCPCS | Performed by: INTERNAL MEDICINE

## 2021-05-26 PROCEDURE — 1101F PT FALLS ASSESS-DOCD LE1/YR: CPT | Performed by: INTERNAL MEDICINE

## 2021-05-26 PROCEDURE — G8417 CALC BMI ABV UP PARAM F/U: HCPCS | Performed by: INTERNAL MEDICINE

## 2021-05-26 PROCEDURE — G8427 DOCREV CUR MEDS BY ELIG CLIN: HCPCS | Performed by: INTERNAL MEDICINE

## 2021-05-26 PROCEDURE — G8753 SYS BP > OR = 140: HCPCS | Performed by: INTERNAL MEDICINE

## 2021-05-26 PROCEDURE — 93000 ELECTROCARDIOGRAM COMPLETE: CPT | Performed by: INTERNAL MEDICINE

## 2021-05-26 PROCEDURE — G8510 SCR DEP NEG, NO PLAN REQD: HCPCS | Performed by: INTERNAL MEDICINE

## 2021-05-26 PROCEDURE — 99214 OFFICE O/P EST MOD 30 MIN: CPT | Performed by: INTERNAL MEDICINE

## 2021-05-26 NOTE — PROGRESS NOTES
Fifi Meraz presents today for   Chief Complaint   Patient presents with    Follow-up     6 month f/u    Pacemaker Check     medtronic       Fifi Meraz preferred language for health care discussion is english/other. Is someone accompanying this pt? no    Is the patient using any DME equipment during [address]? no    Depression Screening:  3 most recent PHQ Screens 5/26/2021   Little interest or pleasure in doing things Not at all   Feeling down, depressed, irritable, or hopeless Not at all   Total Score PHQ 2 0       Learning Assessment:  Learning Assessment 2/11/2014   PRIMARY LEARNER Patient   HIGHEST LEVEL OF EDUCATION - PRIMARY LEARNER  DID NOT GRADUATE HIGH SCHOOL   BARRIERS PRIMARY LEARNER NONE   CO-LEARNER CAREGIVER No   PRIMARY LANGUAGE ENGLISH   LEARNER PREFERENCE PRIMARY DEMONSTRATION   ANSWERED BY patient   RELATIONSHIP SELF       Abuse Screening:  Abuse Screening Questionnaire 5/26/2021   Do you ever feel afraid of your partner? N   Are you in a relationship with someone who physically or mentally threatens you? N   Is it safe for you to go home? Y       Fall Risk  Fall Risk Assessment, last 12 mths 5/26/2021   Able to walk? Yes   Fall in past 12 months? 0   Do you feel unsteady? 0   Are you worried about falling 0       Pt currently taking Anticoagulant therapy? no    Coordination of Care:  1. Have you been to the ER, urgent care clinic since your last visit? Hospitalized since your last visit? no    2. Have you seen or consulted any other health care providers outside of the [address] [place] [address] since your last visit? Include any pap smears or colon screening.  no

## 2021-05-26 NOTE — PROGRESS NOTES
Brian Tatum Simba    F/u PPM    HPI    Jeniffer Mckeon is a 80 y.o. with h/o persistent AFib, CAD s/p PCI LAD 2005, HTN, DM2, Dyslipidemia, h/o TIA here to reestablish her cardiovascular care after a syncopal episode. This is a previous patient of our practice but who relocated-had been living in [place] for the last several years. She comes with her daughter today as they have just recently moved back to [place] and patient will be placed in an assisted living. I was able to obtain records- please see media and pt having HRs as low as 39 with such severe fatigue she can barely make the bed. Apparently fell last year as a result of a stroke but unfortunately brain bleed because of the fall. She had been on Eliquis at the time for her atrial fibrillation. The Eliquis was stopped-she has not been on it since. She has suffered with UTIs frequent falls again which many of these falls were not witnessed. It is not clear if she truly lost consciousness regardless she was hospitalized many times and this happened. Previously been on Eliquis, hydralazine, hydrochlorothiazide, metoprolol and Lasix. Since daughter says her heart rate was in the 20s at night and so her metoprolol was stopped but still the heart rates could go down into the 40s. Also having low blood pressures and problems with potassium so essentially all of her medications were stopped-and she was placed on a 24-hour Holter monitor. 2 days before they had plan to move back here to [place] she says the cardiologist called her and told her that her mom needs a pacemaker. They were already moving he said it was okay for her to establish with somebody here as an urgent need to do it right away. Luckily she has not passed out again and not had any recent problems. No swelling, chest pain or shortness of breath or recurrent syncope.   Does complain of being tired and weak-should be noted that she is gone through quite a bit of rehab and physical therapy and her functional status is greatly improved to where it had been after her last stroke. The left side of her body is generally weaker from her stroke and she feels her left hand is \"puffy\" today but otherwise no new changes. No recent infections etc. Still so tired, she cant make her bed. I sent her for PPM- went well no issues. No complaints today. She is being treated for a UTI, less activity due to COVID-19 lives in an assisted living. Does mention restarted Metformin and having loose stool almost once a day in the late morning. Past Medical History:   Diagnosis Date    Adrenal mass ([facility] [address]     benign on serial f/u by CT    Allergic rhinitis 10/30/2012    Atrial fibrillation ([address] 1/14/2011    CAD (coronary artery disease), native coronary artery     Calculus of kidney     Dr. Soumya T[facility])     left breast carcinoma    Coronary stent 9/2005    drug eluting stent to proximal LAD    Diabetes mellitus ([facility] [address]     microalbuminuria    Dyslipidemia     History of echocardiogram 11/30/2009    Tech diff. A-Fib. EF 60-65%. Mild conc LVH. Mild LAE. Mild MR. Mod RVE. Mod TR.  RVSP 45-50.  History of myocardial perfusion scan 08/12/2013    No ischemia or prior infarction. Very sm area of apical thinning. Hyperdynamic LV function. EF >80%. Equivocally positive EKG on pharm stress test.  Low risk.  Hypertension     Osteopenia     DEXA -1.4 spine, -1.5 hip w FRAX 11 and 2.2 from 6/11    Pulmonary hypertension, secondary     S/P cardiac cath 08/29/2005    LM normal.  mLAD 100%. mCx 25%. PDA 30%. RCA mild. EF 60-65%. AA mildly dilated.         Past Surgical History:   Procedure Laterality Date    CARDIAC SURG PROCEDURE UNLIST  9/1/2005    coronary angiography stent deplyoment    CARDIAC SURG PROCEDURE UNLIST  8/13    thallium negative ef 80%    HX CORONARY STENT PLACEMENT      HX GI  03/20/08    Laproscopic low anterior resection Dr. Pro Giles MASTECTOMY  02/2007    left modified     HX UROLOGICAL  6/2/2016    [facility], Dr. Tara Lind, Cystoscopy, retrograde ureteroscopy, double-J catheter    MI COLONOSCOPY FLX DX W/COLLJ [facility] WHEN PFRMD  2008    s/p C for adenoma Dr. Maira Saravia INS NEW/RPLC [address] W/TRANSV ELTRD VENTR Left 3/20/2020    Insert Ppm Single Ventricular performed by Jalyn Maki MD at [facility] CATH LAB       Current Outpatient Medications   Medication Sig Dispense Refill    carvediloL (COREG) 6.25 mg tablet Take 1 Tab by mouth two (2) times daily (with meals). Indications: high blood pressure, ventricular rate control in atrial fibrillation 60 Tab 3    nitrofurantoin, macrocrystal-monohydrate, (Macrobid) 100 mg capsule Take 100 mg by mouth two (2) times a day.  coenzyme Q-10 (Co Q-10) 200 mg capsule Take 100 mg by mouth daily.  acetaminophen (TYLENOL) 325 mg tablet Take 2 Tabs by mouth every four (4) hours as needed for Pain. 20 Tab 0    levothyroxine (SYNTHROID) 88 mcg tablet Take  by mouth Daily (before breakfast).  loratadine (CLARITIN) 10 mg tablet Take 10 mg by mouth two (2) times a day.  traZODone (DESYREL) 50 mg tablet Take  by mouth nightly.  oxybutynin (DITROPAN) 5 mg tablet Take 5 mg by mouth two (2) times a day.  folic acid (FOLVITE) 1 mg tablet Take 1 Tab by mouth daily. 30 Tab 0    OTHER Incentive Spirometry- use as directed 1 Each 0    escitalopram oxalate (LEXAPRO) 10 mg tablet Take 10 mg by mouth daily as needed.  atorvastatin (LIPITOR) 40 mg tablet Take 1 Tab by mouth daily. 90 Tab 3    fluticasone (FLONASE) 50 mcg/actuation nasal spray 2 sprays each nostril daily 1 Bottle 12    pantoprazole (PROTONIX) 40 mg tablet Take 1 Tab by mouth daily. 90 Tab 3    budesonide-formoterol (SYMBICORT) 160-4.5 mcg/Actuation HFA inhaler Take 2 Puffs by inhalation as needed.  albuterol (VENTOLIN HFA) 90 mcg/Actuation inhaler Take 1 Puff by inhalation as needed. Allergies   Allergen Reactions    Amiodarone Other (comments)     hallucinations    Codeine Unable to Obtain    Sulfa (Sulfonamide Antibiotics) Unable to Obtain       Social History     Socioeconomic History    Marital status:      Spouse name: Not on file    Number of children: 6    Years of education: Not on file    Highest education level: Not on file   Occupational History    Occupation: retired    Tobacco Use    Smoking status: Never Smoker    Smokeless tobacco: Never Used   Substance and Sexual Activity    Alcohol use: No    Drug use: No    Sexual activity: Not on file   Other Topics Concern    Not on file   Social History Narrative    Not on file     Social Determinants of Health     Financial Resource Strain:     Difficulty of Paying Living Expenses:    Food Insecurity:     Worried About [address] [employer] in the Last Year:     [address] in the Last Year:    Transportation Needs:     Lack of Transportation (Medical):  Lack of Transportation (Non-Medical):    Physical Activity:     Days of Exercise per Week:     Minutes of Exercise per Session:    Stress:     Feeling of Stress :    Social Connections:     Frequency of Communication with Friends and Family:     Frequency of Social Gatherings with Friends and Family:     Attends [employer] Services:     Active Member of Clubs or Organizations:     Attends Club or Organization Meetings:     Marital Status:    Intimate Partner Violence:     Fear of Current or Ex-Partner:     Emotionally Abused:     Physically Abused:     Sexually Abused:         FH: n/a    Review of Systems    14 pt Review of Systems is negative unless otherwise mentioned in the HPI.     Wt Readings from Last 3 Encounters:   05/26/21 82.1 kg (181 lb)   11/19/20 87.5 kg (193 lb)   05/27/20 89.8 kg (198 lb)     Temp Readings from Last 3 Encounters:   03/20/20 98.6 °F (37 °C)   03/12/20 97.1 °F (36.2 °C)   06/22/16 98.4 °F (36.9 °C)     BP Readings from Last 3 Encounters:   05/26/21 (!) 140/90   11/19/20 128/74   05/27/20 (!) 150/92     Pulse Readings from Last 3 Encounters:   05/26/21 80   11/19/20 89   05/27/20 78     Physical Exam:    Visit Vitals  BP (!) 140/90 (BP 1 Location: Right upper arm, BP Patient Position: Sitting, BP Cuff Size: Adult)   Pulse 80   Ht 5' 4\" (1.626 m)   Wt 82.1 kg (181 lb)   SpO2 98%   BMI 31.07 kg/m²      Physical Exam  Vitals reviewed. HENT:      Head: Normocephalic and atraumatic. Eyes:      Pupils: Pupils are equal, round, and reactive to light. Cardiovascular:      Rate and Rhythm: Normal rate and regular rhythm. Heart sounds: Normal heart sounds. No murmur heard. No friction rub. No gallop. Pulmonary:      Effort: Pulmonary effort is normal. No respiratory distress. Breath sounds: Normal breath sounds. No wheezing or rales. Chest:      Chest wall: No tenderness. Abdominal:      General: Bowel sounds are normal.      Palpations: Abdomen is soft. Musculoskeletal:         General: No tenderness. Skin:     General: Skin is warm and dry. Neurological:      Mental Status: She is alert and oriented to person, place, and time. Device check WNL    Impression and Plan:  Sil Christopher is a 80 y.o. with:    1.) pAF with SSS s/p PPM  2.) CAD s/p PCI LAD 05'  3.) TIA/ CVA with brain bleed s/p fall  4.) DM2  5.) HTN  6.) Dyslipidemia  7.) Normal LV function echo 12/2019  8.) Recurrent UTI, known    RTC 6 months with device check  No change in meds, only on Coreg for rate control, no anticoag due to bleeding risk    Thank you for allowing me to participate in the care of your patient, please do not hesitate to call with questions or concerns.     Kindest Regards,    Frank Melvin, DO

## 2021-05-27 NOTE — PROGRESS NOTES
I have personally seen and evaluated the device findings. Interrogation reviewed and I agree with assessment.     Tammi Trotter

## 2021-07-05 NOTE — DISCHARGE INSTRUCTIONS
Dr. Kyle Hsieh called and wanted to let pts primary care nurse know that he is stopping the heparin drip and placing the pt on the Lovenox injections; this nurse explained that all of the PRN bolus heparin orders had to be discontinued as well;        Jenna Mehta RN  07/05/21 1200 Patient Education        Pacemaker Placement: What to Expect at Home  Your Recovery    Pacemaker placement is surgery to put a pacemaker in your chest. This surgery may be done if you have bradycardia (a slow heart rate). A pacemaker is a small, battery-powered device. It sends electrical signals to the heart. These signals work to keep the heartbeat steady. Thin wires, called leads, carry the signals from the pacemaker to the heart. A pacemaker can prevent or reduce dizziness, fainting, and shortness of breath caused by a slow or unsteady heartbeat. Your chest may be sore where the doctor made the cut (incision) and put in the pacemaker. You also may have a bruise and mild swelling. These symptoms usually get better in 1 to 2 weeks. You may feel a hard ridge along the incision. This usually gets softer in the months after surgery. You may be able to see or feel the outline of the pacemaker under your skin. You will probably be able to go back to work or your usual routine 1 to 2 weeks after surgery. Pacemaker batteries usually last 5 to 15 years. Your doctor will talk to you about how often you will need to have your pacemaker checked. You'll need to take steps to safely use electric devices. Some of these devices can stop your pacemaker from working right for a short time. Check with your doctor about what to avoid and what to keep a short distance away from your pacemaker. For example, you will need to stay away from things with strong magnetic and electrical fields. An example is an MRI machine (unless your pacemaker is safe for an MRI). You can use a cell phone and other wireless devices but keep them at least 6 inches away from your pacemaker. Many household and office electronics do not affect a pacemaker. These include kitchen appliances and computers. This care sheet gives you a general idea about how long it will take for you to recover. But each person recovers at a different pace.  Follow the steps below to get better as quickly as possible. How can you care for yourself at home? Activity    · Rest when you feel tired.     · Be active. Walking is a good choice.     · For 4 to 6 weeks:  ? Avoid activities that strain your chest or upper arm muscles. This includes pushing a  or vacuum, or mopping floors. It also includes swimming, or swinging a golf club or tennis racquet. ? Do not raise your arm (the one on the side of your body where the pacemaker is located) above your shoulder. ? Allow your body to heal. Don't move quickly or lift anything heavy until you are feeling better.     · Many people are able to return to work within 1 to 2 weeks after surgery.     · Ask your doctor when it is okay for you to have sex. Diet    · You can eat your normal diet. If your stomach is upset, try bland, low-fat foods like plain rice, broiled chicken, toast, and yogurt. Medicines    · Your doctor will tell you if and when you can restart your medicines. He or she will also give you instructions about taking any new medicines.     · If you take aspirin or some other blood thinner, be sure to talk to your doctor. He or she will tell you if and when to start taking this medicine again. Make sure that you understand exactly what your doctor wants you to do.     · Be safe with medicines. Read and follow all instructions on the label. ? If the doctor gave you a prescription medicine for pain, take it as prescribed. ? If you are not taking a prescription pain medicine, ask your doctor if you can take an over-the-counter medicine. ? Do not take aspirin, ibuprofen (Advil, Motrin), naproxen (Aleve), or other nonsteroidal anti-inflammatory drugs (NSAIDs) unless your doctor says it is okay.     · If your doctor prescribed antibiotics, take them as directed. Do not stop taking them just because you feel better. You need to take the full course of antibiotics.    Incision care    · If you have strips of tape on the incision, leave the tape on for a week or until it falls off.     · Keep the incision dry while it heals. Your doctor may recommend sponge baths for about 7 days, but do not get the incision wet. Your doctor will let you know when you may take showers. After a shower, pat the incision dry.     · Don't use hydrogen peroxide or alcohol on the incision, which can slow healing. You may cover the area with a gauze bandage if it oozes fluid or rubs against clothing. Change the bandage every day.     · Do not take a bath or get into a hot tub for the first 2 weeks, or until your doctor tells you it is okay. Other instructions    · Keep a medical ID card with you at all times that says you have a pacemaker. The card should include the  and model information.     · Wear medical alert jewelry stating that you have a pacemaker. You can buy this at most [employer].     · Check your pulse as directed by your doctor.     · Have your pacemaker checked as often as your doctor recommends. In some cases, this may be done over the phone or the Internet. Your doctor will give you instructions about how to do this. Follow-up care is a key part of your treatment and safety. Be sure to make and go to all appointments, and call your doctor if you are having problems. It's also a good idea to know your test results and keep a list of the medicines you take. When should you call for help? Call 911 anytime you think you may need emergency care.  For example, call if:    · You passed out (lost consciousness).     · You have trouble breathing.    Call your doctor now or seek immediate medical care if:    · You are dizzy or light-headed, or you feel like you may faint.     · You have pain that does not get better after you take pain medicine.     · You hear an alarm or feel a vibration from your pacemaker.     · You have loose stitches, or your incision comes open.     · Bright red blood has soaked through the bandage over your incision.     · You have signs of infection, such as:  ? Increased pain, swelling, warmth, or redness. ? Red streaks leading from the incision. ? Pus draining from the incision. ? A fever.    Watch closely for changes in your health, and be sure to contact your doctor if:    · You have any problems with your pacemaker. Where can you learn more? Go to http://rolanda-jaya.info/  Enter G550 in the search box to learn more about \"Pacemaker Placement: What to Expect at Home. \"  Current as of: December 15, 2019Content Version: 12.4  © 7118-2376 [employer]. Care instructions adapted under license by [employer] (which disclaims liability or warranty for this information). If you have questions about a medical condition or this instruction, always ask your healthcare professional. [address] any warranty or liability for your use of this information. DISCHARGE SUMMARY from Nurse:    Please resume taking your home medication as prescribed. PATIENT INSTRUCTIONS:    After general anesthesia or intravenous sedation, for 24 hours or while taking prescription Narcotics:  · Limit your activities  · Do not drive and operate hazardous machinery  · Do not make important personal or business decisions  · Do  not drink alcoholic beverages  · If you have not urinated within 8 hours after discharge, please contact your surgeon on call. Report the following to your surgeon:  · Excessive pain, swelling, redness or odor of or around the surgical area  · Temperature over 100.5  · Nausea and vomiting lasting longer than 4 hours or if unable to take medications  · Any signs of decreased circulation or nerve impairment to extremity: change in color, persistent  numbness, tingling, coldness or increase pain  · Any questions    What to do at Home:  Recommended activity: No lifting, Driving, or Strenuous exercise for 48 hours.     If you experience any of the following symptoms bleeding, swelling,acute pain or numbness, fever, please follow up with Dr. Alexandre Gutierres MD.    *  Please give a list of your current medications to your Primary Care Provider. *  Please update this list whenever your medications are discontinued, doses are      changed, or new medications (including over-the-counter products) are added. *  Please carry medication information at all times in case of emergency situations. These are general instructions for a healthy lifestyle:    No smoking/ No tobacco products/ Avoid exposure to second hand smoke  Surgeon General's Warning:  Quitting smoking now greatly reduces serious risk to your health. Obesity, smoking, and sedentary lifestyle greatly increases your risk for illness    A healthy diet, regular physical exercise & weight monitoring are important for maintaining a healthy lifestyle    You may be retaining fluid if you have a history of heart failure or if you experience any of the following symptoms:  Weight gain of 3 pounds or more overnight or 5 pounds in a week, increased swelling in our hands or feet or shortness of breath while lying flat in bed. Please call your doctor as soon as you notice any of these symptoms; do not wait until your next office visit. The discharge information has been reviewed with the patient. The patient verbalized understanding. Discharge medications reviewed with the patient and appropriate educational materials and side effects teaching were provided. Patient armband removed and shredded  MyChart Activation    Thank you for requesting access to codesy. Please follow the instructions below to securely access and download your online medical record. codesy allows you to send messages to your doctor, view your test results, renew your prescriptions, schedule appointments, and more. How Do I Sign Up? 1. In your internet browser, go to [email or web address].   2. Click on the First Time User? Click Here link in the Sign In box. You will see the New Member Sign Up page. 3. Enter your Meniga Access Code exactly as it appears below. You will not need to use this code after youve completed the sign-up process. If you do not sign up before the expiration date, you must request a new code. Meniga Access Code: [email or web address]  Expires: 2020  2:44 PM (This is the date your Meniga access code will )    4. Enter the last four digits of your Social Security Number (xxxx) and Date of Birth (mm/dd/yyyy) as indicated and click Submit. You will be taken to the next sign-up page. 5. Create a Meniga ID. This will be your Meniga login ID and cannot be changed, so think of one that is secure and easy to remember. 6. Create a Meniga password. You can change your password at any time. 7. Enter your Password Reset Question and Answer. This can be used at a later time if you forget your password. 8. Enter your e-mail address. You will receive e-mail notification when new information is available in [address] 9. Click Sign Up. You can now view and download portions of your medical record. 10. Click the Download Summary menu link to download a portable copy of your medical information. Additional Information    If you have questions, please visit the Frequently Asked Questions section of the Meniga website at [email or web address] Remember, Meniga is NOT to be used for urgent needs.  For medical emergencies, dial 911.    ___________________________________________________________________________________________________________________________________

## 2021-08-18 ENCOUNTER — OFFICE VISIT (OUTPATIENT)
Dept: CARDIOLOGY CLINIC | Age: 86
End: 2021-08-18
Payer: MEDICARE

## 2021-08-18 DIAGNOSIS — Z95.0 PACEMAKER: ICD-10-CM

## 2021-08-18 DIAGNOSIS — I49.5 SICK SINUS SYNDROME (HCC): Primary | ICD-10-CM

## 2021-08-18 PROCEDURE — 93294 REM INTERROG EVL PM/LDLS PM: CPT | Performed by: INTERNAL MEDICINE

## 2021-08-18 PROCEDURE — 93296 REM INTERROG EVL PM/IDS: CPT | Performed by: INTERNAL MEDICINE

## 2021-08-27 NOTE — PROGRESS NOTES
I have personally seen and evaluated the device findings. Interrogation reviewed and I agree with assessment.     Chanel Green

## 2022-02-23 ENCOUNTER — OFFICE VISIT (OUTPATIENT)
Dept: CARDIOLOGY CLINIC | Age: 87
End: 2022-02-23
Payer: MEDICARE

## 2022-02-23 DIAGNOSIS — I49.5 SICK SINUS SYNDROME (HCC): ICD-10-CM

## 2022-02-23 DIAGNOSIS — Z95.0 PACEMAKER: Primary | ICD-10-CM

## 2022-02-23 PROCEDURE — 93294 REM INTERROG EVL PM/LDLS PM: CPT | Performed by: INTERNAL MEDICINE

## 2022-03-08 NOTE — PROGRESS NOTES
I have personally seen and evaluated the device findings. Interrogation reviewed and I agree with assessment.     Saturnino Ray

## 2022-03-10 NOTE — PROGRESS NOTES
I have personally seen and evaluated the device findings. Interrogation reviewed and I agree with assessment.     Bri Kelley

## 2022-06-07 ENCOUNTER — HOSPITAL ENCOUNTER (EMERGENCY)
Age: 87
Discharge: HOME | End: 2022-06-07
Attending: EMERGENCY MEDICINE
Payer: MEDICARE

## 2022-06-07 ENCOUNTER — APPOINTMENT (OUTPATIENT)
Dept: GENERAL RADIOLOGY | Age: 87
End: 2022-06-07
Attending: EMERGENCY MEDICINE
Payer: MEDICARE

## 2022-06-07 ENCOUNTER — APPOINTMENT (OUTPATIENT)
Dept: CT IMAGING | Age: 87
End: 2022-06-07
Attending: EMERGENCY MEDICINE
Payer: MEDICARE

## 2022-06-07 VITALS
HEART RATE: 72 BPM | SYSTOLIC BLOOD PRESSURE: 142 MMHG | HEIGHT: 62 IN | OXYGEN SATURATION: 98 % | TEMPERATURE: 98.2 F | WEIGHT: 180 LBS | RESPIRATION RATE: 28 BRPM | BODY MASS INDEX: 33.13 KG/M2 | DIASTOLIC BLOOD PRESSURE: 114 MMHG

## 2022-06-07 DIAGNOSIS — R06.02 SOB (SHORTNESS OF BREATH): Primary | ICD-10-CM

## 2022-06-07 DIAGNOSIS — R06.09 DOE (DYSPNEA ON EXERTION): ICD-10-CM

## 2022-06-07 LAB
ANION GAP SERPL CALC-SCNC: 2 MMOL/L (ref 3–18)
ATRIAL RATE: 46 BPM
BASOPHILS # BLD: 0 K/UL (ref 0–0.1)
BASOPHILS NFR BLD: 0 % (ref 0–2)
BUN SERPL-MCNC: 13 MG/DL (ref 7–18)
BUN/CREAT SERPL: 12 (ref 12–20)
CALCIUM SERPL-MCNC: 9.9 MG/DL (ref 8.5–10.1)
CALCULATED R AXIS, ECG10: 101 DEGREES
CALCULATED T AXIS, ECG11: -165 DEGREES
CHLORIDE SERPL-SCNC: 104 MMOL/L (ref 100–111)
CO2 SERPL-SCNC: 32 MMOL/L (ref 21–32)
CREAT SERPL-MCNC: 1.07 MG/DL (ref 0.6–1.3)
DIAGNOSIS, 93000: NORMAL
DIFFERENTIAL METHOD BLD: ABNORMAL
EOSINOPHIL # BLD: 0 K/UL (ref 0–0.4)
EOSINOPHIL NFR BLD: 0 % (ref 0–5)
ERYTHROCYTE [DISTWIDTH] IN BLOOD BY AUTOMATED COUNT: 12.9 % (ref 11.6–14.5)
GLUCOSE SERPL-MCNC: 138 MG/DL (ref 74–99)
HCT VFR BLD AUTO: 45.1 % (ref 35–45)
HGB BLD-MCNC: 15 G/DL (ref 12–16)
IMM GRANULOCYTES # BLD AUTO: 0 K/UL (ref 0–0.04)
IMM GRANULOCYTES NFR BLD AUTO: 0 % (ref 0–0.5)
LYMPHOCYTES # BLD: 0.8 K/UL (ref 0.9–3.6)
LYMPHOCYTES NFR BLD: 11 % (ref 21–52)
MCH RBC QN AUTO: 30.7 PG (ref 24–34)
MCHC RBC AUTO-ENTMCNC: 33.3 G/DL (ref 31–37)
MCV RBC AUTO: 92.2 FL (ref 78–100)
MONOCYTES # BLD: 0.6 K/UL (ref 0.05–1.2)
MONOCYTES NFR BLD: 8 % (ref 3–10)
NEUTS SEG # BLD: 6 K/UL (ref 1.8–8)
NEUTS SEG NFR BLD: 80 % (ref 40–73)
NRBC # BLD: 0 K/UL (ref 0–0.01)
NRBC BLD-RTO: 0 PER 100 WBC
PLATELET # BLD AUTO: 189 K/UL (ref 135–420)
PMV BLD AUTO: 10.1 FL (ref 9.2–11.8)
POTASSIUM SERPL-SCNC: 4.2 MMOL/L (ref 3.5–5.5)
Q-T INTERVAL, ECG07: 458 MS
QRS DURATION, ECG06: 152 MS
QTC CALCULATION (BEZET), ECG08: 461 MS
RBC # BLD AUTO: 4.89 M/UL (ref 4.2–5.3)
SODIUM SERPL-SCNC: 138 MMOL/L (ref 136–145)
TROPONIN-HIGH SENSITIVITY: 26 NG/L (ref 0–54)
TROPONIN-HIGH SENSITIVITY: 26 NG/L (ref 0–54)
VENTRICULAR RATE, ECG03: 61 BPM
WBC # BLD AUTO: 7.4 K/UL (ref 4.6–13.2)

## 2022-06-07 PROCEDURE — 80048 BASIC METABOLIC PNL TOTAL CA: CPT

## 2022-06-07 PROCEDURE — 74011636637 HC RX REV CODE- 636/637: Performed by: EMERGENCY MEDICINE

## 2022-06-07 PROCEDURE — 84484 ASSAY OF TROPONIN QUANT: CPT

## 2022-06-07 PROCEDURE — 93005 ELECTROCARDIOGRAM TRACING: CPT

## 2022-06-07 PROCEDURE — 94640 AIRWAY INHALATION TREATMENT: CPT

## 2022-06-07 PROCEDURE — 85025 COMPLETE CBC W/AUTO DIFF WBC: CPT

## 2022-06-07 PROCEDURE — 71275 CT ANGIOGRAPHY CHEST: CPT

## 2022-06-07 PROCEDURE — 74011000636 HC RX REV CODE- 636: Performed by: EMERGENCY MEDICINE

## 2022-06-07 PROCEDURE — 99285 EMERGENCY DEPT VISIT HI MDM: CPT

## 2022-06-07 PROCEDURE — 71045 X-RAY EXAM CHEST 1 VIEW: CPT

## 2022-06-07 PROCEDURE — 74011000250 HC RX REV CODE- 250: Performed by: EMERGENCY MEDICINE

## 2022-06-07 RX ORDER — PREDNISONE 20 MG/1
60 TABLET ORAL
Status: COMPLETED | OUTPATIENT
Start: 2022-06-07 | End: 2022-06-07

## 2022-06-07 RX ORDER — ALBUTEROL SULFATE 0.83 MG/ML
2.5 SOLUTION RESPIRATORY (INHALATION)
Status: COMPLETED | OUTPATIENT
Start: 2022-06-07 | End: 2022-06-07

## 2022-06-07 RX ADMIN — IOPAMIDOL 78 ML: 755 INJECTION, SOLUTION INTRAVENOUS at 15:49

## 2022-06-07 RX ADMIN — ALBUTEROL SULFATE 2.5 MG: 2.5 SOLUTION RESPIRATORY (INHALATION) at 14:38

## 2022-06-07 RX ADMIN — PREDNISONE 60 MG: 20 TABLET ORAL at 14:35

## 2022-06-07 NOTE — ED PROVIDER NOTES
EMERGENCY DEPARTMENT HISTORY AND PHYSICAL EXAM  This was created with voice recognition software and transcription errors may be present. 10:19 AM  Date: 6/7/2022  Patient Name: Daiana Doherty    History of Presenting Illness     Chief Complaint:    History Provided By:     HPI: Daiana Doherty is a 80 y.o. female past medical history of adrenal mass A. fib coronary disease breast cancer dyslipidemia hypertension osteopenia who presents with shortness of breath. Patient states that shortness of breath has been going on for the past 3 to 4 months. She states it is exertional.  She denies any chest pain with it but she states it is worse with either walking to another room or walking up a flight of stairs. She has no nausea or vomiting. She states that she does not get short of breath at night when she lays down. No history of fevers or chills. PCP: Terrance Casey MD      Past History     Past Medical History:  Past Medical History:   Diagnosis Date    Adrenal mass ([address]     benign on serial f/u by CT    Allergic rhinitis 10/30/2012    Atrial fibrillation ([address] 1/14/2011    CAD (coronary artery disease), native coronary artery     Calculus of kidney     Dr. Weinstein [facility]bands [facility])     left breast carcinoma    Coronary stent 9/2005    drug eluting stent to proximal LAD    Diabetes mellitus [address]     microalbuminuria    Dyslipidemia     History of echocardiogram 11/30/2009    Tech diff. A-Fib. EF 60-65%. Mild conc LVH. Mild LAE. Mild MR. Mod RVE. Mod TR.  RVSP 45-50.  History of myocardial perfusion scan 08/12/2013    No ischemia or prior infarction. Very sm area of apical thinning. Hyperdynamic LV function. EF >80%. Equivocally positive EKG on pharm stress test.  Low risk.  Hypertension     Osteopenia     DEXA -1.4 spine, -1.5 hip w FRAX 11 and 2.2 from 6/11    Pulmonary hypertension, secondary     S/P cardiac cath 08/29/2005    LM normal.  mLAD 100%. mCx 25%. PDA 30%. RCA mild. EF 60-65%. AA mildly dilated. Past Surgical History:  Past Surgical History:   Procedure Laterality Date    HX CORONARY STENT PLACEMENT      HX GI  03/20/08    Laproscopic low anterior resection Dr. Elvia Kaufman [address]  02/2007    left modified     HX UROLOGICAL  6/2/2016    [facility], Dr. Janine Sloan, Cystoscopy, retrograde ureteroscopy, double-J catheter    SD CARDIAC SURG PROCEDURE UNLIST  9/1/2005    coronary angiography stent deplyoment    SD CARDIAC SURG PROCEDURE UNLIST  8/13    thallium negative ef 80%    SD COLONOSCOPY FLX DX W/COLLJ SPEC WHEN PFRMD  2008    s/p LHC for adenoma Dr. Yisel Shah INS NEW/RPLC [facility] W/TRANSV ELTRD VENTR Left 3/20/2020    Insert Ppm Single Ventricular performed by Reed Ozuna MD at [facility] CATH LAB       Family History:  Family History   Problem Relation Age of Onset    Pacemaker Mother     Cancer Brother         prostate    Heart Disease Brother        Social History:  Social History     Tobacco Use    Smoking status: Never Smoker    Smokeless tobacco: Never Used   Substance Use Topics    Alcohol use: No    Drug use: No       Allergies: Allergies   Allergen Reactions    Amiodarone Other (comments)     hallucinations    Codeine Unable to Obtain    Sulfa (Sulfonamide Antibiotics) Unable to Obtain       Review of Systems     Review of Systems   All other systems reviewed and are negative. 10 point review of systems otherwise negative unless noted in HPI. Physical Exam       Physical Exam  Constitutional:       Appearance: She is well-developed. HENT:      Head: Normocephalic and atraumatic. Eyes:      Pupils: Pupils are equal, round, and reactive to light. Cardiovascular:      Rate and Rhythm: Normal rate and regular rhythm. Heart sounds: Normal heart sounds. No murmur heard. No friction rub. Pulmonary:      Effort: Pulmonary effort is normal. No respiratory distress.       Breath sounds: Normal breath sounds. No wheezing. Abdominal:      General: There is no distension. Palpations: Abdomen is soft. Tenderness: There is no abdominal tenderness. There is no guarding or rebound. Musculoskeletal:         General: Normal range of motion. Cervical back: Normal range of motion and neck supple. Skin:     General: Skin is warm and dry. Neurological:      Mental Status: She is alert and oriented to person, place, and time. Psychiatric:         Behavior: Behavior normal.         Thought Content: Thought content normal.         Diagnostic Study Results     Vital Signs  EKG: EKG shows sinus at 61 with a normal axis QRS of 152 paced rhythm without acute ischemia  Labs: CBC unremarkable chemistry unremarkable troponin 26  Imaging: IMPRESSION  1. No radiographic evidence of acute cardiopulmonary process. 2.  Unchanged cardiac silhouette enlargement. Medical Decision Making     ED Course: Progress Notes, Reevaluation, and Consults:    I will be the provider of record for this patient. Provider Notes (Medical Decision Making): 66-year-old female who presents with shortness of breath. It is exertional but not at night. Certainly could be COPD also consideration for atypical angina. Last cath 2005    Family notes history of asthma however no wheezing. They say on any exertion where she walks she just have some puffs but does not wheeze. She becomes very short of breath and takes a while to recover. I do not hear any wheezing on exam today. She was in A. fib she is not on anticoagulation low suspicion of a PE but will do a dimer and CTA if needed. Turned over to Dr. Luis Vidales if the CTA is negative I think she can probably go home we have cardiology follow-up tomorrow. We will also likely need pulmonary follow-up in case the etiology is not cardiac in nature. IMPRESSION     1.  No pulmonary embolism.     2. Enlarged main pulmonary artery suggests pulmonary arterial hypertension.     3. Cardiomegaly, with dilatation of the right ventricle and atrium, and reflux  of contrast into the IVC and hepatic veins, which can be seen with elevated  right heart pressure.     4. Other chronic findings as described above. Overall etiology of shortness of breath is unclear no PE no wheezing on exam may be pulmonary hypertension, cardiology and pulmonary    Diagnosis     Clinical Impression: No diagnosis found. Disposition:        Patient's Medications   Start Taking    No medications on file   Continue Taking    ACETAMINOPHEN (TYLENOL) 325 MG TABLET    Take 2 Tabs by mouth every four (4) hours as needed for Pain. ALBUTEROL (VENTOLIN HFA) 90 MCG/ACTUATION INHALER    Take 1 Puff by inhalation as needed. ATORVASTATIN (LIPITOR) 40 MG TABLET    Take 1 Tab by mouth daily. BUDESONIDE-FORMOTEROL (SYMBICORT) 160-4.5 MCG/ACTUATION HFA INHALER    Take 2 Puffs by inhalation as needed. CARVEDILOL (COREG) 6.25 MG TABLET    Take 1 Tab by mouth two (2) times daily (with meals). Indications: high blood pressure, ventricular rate control in atrial fibrillation    COENZYME Q-10 (CO Q-10) 200 MG CAPSULE    Take 100 mg by mouth daily. ESCITALOPRAM OXALATE (LEXAPRO) 10 MG TABLET    Take 10 mg by mouth daily as needed. FLUTICASONE (FLONASE) 50 MCG/ACTUATION NASAL SPRAY    2 sprays each nostril daily    FOLIC ACID (FOLVITE) 1 MG TABLET    Take 1 Tab by mouth daily. LEVOTHYROXINE (SYNTHROID) 88 MCG TABLET    Take  by mouth Daily (before breakfast). LORATADINE (CLARITIN) 10 MG TABLET    Take 10 mg by mouth two (2) times a day. NITROFURANTOIN, MACROCRYSTAL-MONOHYDRATE, (MACROBID) 100 MG CAPSULE    Take 100 mg by mouth two (2) times a day. OTHER    Incentive Spirometry- use as directed    OXYBUTYNIN (DITROPAN) 5 MG TABLET    Take 5 mg by mouth two (2) times a day. PANTOPRAZOLE (PROTONIX) 40 MG TABLET    Take 1 Tab by mouth daily.     TRAZODONE (DESYREL) 50 MG TABLET    Take  by mouth nightly.    These Medications have changed    No medications on file   Stop Taking    No medications on file

## 2022-06-08 ENCOUNTER — OFFICE VISIT (OUTPATIENT)
Dept: CARDIOLOGY CLINIC | Age: 87
End: 2022-06-08
Payer: MEDICARE

## 2022-06-08 VITALS
WEIGHT: 182 LBS | HEIGHT: 62 IN | SYSTOLIC BLOOD PRESSURE: 120 MMHG | OXYGEN SATURATION: 98 % | HEART RATE: 62 BPM | BODY MASS INDEX: 33.49 KG/M2 | DIASTOLIC BLOOD PRESSURE: 66 MMHG

## 2022-06-08 DIAGNOSIS — E78.5 DYSLIPIDEMIA: ICD-10-CM

## 2022-06-08 DIAGNOSIS — Z95.0 PACEMAKER: ICD-10-CM

## 2022-06-08 DIAGNOSIS — Z79.01 LONG TERM (CURRENT) USE OF ANTICOAGULANTS: ICD-10-CM

## 2022-06-08 DIAGNOSIS — R06.02 SHORTNESS OF BREATH: Primary | ICD-10-CM

## 2022-06-08 DIAGNOSIS — I25.10 CORONARY ARTERY DISEASE INVOLVING NATIVE CORONARY ARTERY OF NATIVE HEART WITHOUT ANGINA PECTORIS: ICD-10-CM

## 2022-06-08 DIAGNOSIS — I49.5 SICK SINUS SYNDROME (HCC): ICD-10-CM

## 2022-06-08 PROCEDURE — 1090F PRES/ABSN URINE INCON ASSESS: CPT | Performed by: NURSE PRACTITIONER

## 2022-06-08 PROCEDURE — G8417 CALC BMI ABV UP PARAM F/U: HCPCS | Performed by: NURSE PRACTITIONER

## 2022-06-08 PROCEDURE — G8427 DOCREV CUR MEDS BY ELIG CLIN: HCPCS | Performed by: NURSE PRACTITIONER

## 2022-06-08 PROCEDURE — G8536 NO DOC ELDER MAL SCRN: HCPCS | Performed by: NURSE PRACTITIONER

## 2022-06-08 PROCEDURE — 1123F ACP DISCUSS/DSCN MKR DOCD: CPT | Performed by: NURSE PRACTITIONER

## 2022-06-08 PROCEDURE — G8432 DEP SCR NOT DOC, RNG: HCPCS | Performed by: NURSE PRACTITIONER

## 2022-06-08 PROCEDURE — 99214 OFFICE O/P EST MOD 30 MIN: CPT | Performed by: NURSE PRACTITIONER

## 2022-06-08 PROCEDURE — 1101F PT FALLS ASSESS-DOCD LE1/YR: CPT | Performed by: NURSE PRACTITIONER

## 2022-06-08 RX ORDER — LORAZEPAM 0.5 MG/1
0.5 TABLET ORAL
COMMUNITY

## 2022-06-08 RX ORDER — LOPERAMIDE HYDROCHLORIDE 2 MG/1
2 CAPSULE ORAL
COMMUNITY

## 2022-06-08 RX ORDER — OXYBUTYNIN CHLORIDE 10 MG/1
30 TABLET, EXTENDED RELEASE ORAL DAILY
COMMUNITY

## 2022-06-08 RX ORDER — POTASSIUM CHLORIDE 750 MG/1
10 CAPSULE, EXTENDED RELEASE ORAL DAILY
Qty: 30 CAPSULE | Refills: 4 | Status: SHIPPED | OUTPATIENT
Start: 2022-06-08 | End: 2022-06-08 | Stop reason: SDUPTHER

## 2022-06-08 RX ORDER — FUROSEMIDE 20 MG/1
20 TABLET ORAL DAILY
Qty: 30 TABLET | Refills: 4 | Status: SHIPPED | OUTPATIENT
Start: 2022-06-08 | End: 2022-06-08 | Stop reason: SDUPTHER

## 2022-06-08 RX ORDER — ASPIRIN 81 MG/1
81 TABLET ORAL DAILY
COMMUNITY

## 2022-06-08 RX ORDER — FUROSEMIDE 20 MG/1
20 TABLET ORAL DAILY
Qty: 30 TABLET | Refills: 4 | Status: SHIPPED | OUTPATIENT
Start: 2022-06-08 | End: 2022-10-20

## 2022-06-08 RX ORDER — POTASSIUM CHLORIDE 750 MG/1
10 TABLET, EXTENDED RELEASE ORAL DAILY
Qty: 30 TABLET | Refills: 4 | Status: SHIPPED | OUTPATIENT
Start: 2022-06-08

## 2022-06-08 NOTE — PATIENT INSTRUCTIONS
BMP and NT pro-BNP to be done in about 2 weeks  Echocardiogram; Dx: SOB  Begin lasix 20mg once a day  Begin potassium chloride 10meq once day  Follow-up with Royce Edwards in 2 weeks  Follow-up with Dr. Grant Chavarria as scheduled and as needed  Weigh daily and record  Limit sodium intake to 1500mg per day  Limit fluid intake to no more than  6, eight ounce glasses of any type of fluids per day (total of 48 ounces per day)  Call if you notice sudden or progressive weight gain (3-5 pounds in 2-3 days), increasing shortness of breath, abdominal bloating, increasing lower extremity edema, inability to lie flat or on your normal number of pillows, having to sit up to catch your breath, fatigue, increased somnolence (sleeping more), poor appetite

## 2022-06-08 NOTE — PROGRESS NOTES
[address] [facility]. presents today for a post-ER follow-up of shortness of breath. She presented to the ER yesterday with complaints of exertional shortness of breath occurring for the past 3-4 months. Chest x-ray was negative for cardiopulmonary processes, CTA of the chest was negative for PE. Her troponins were with normal range at 26 x 2 sets. She is an 80year old female with history of persistent atrial fibrillation, CAD (s/p PCI LAD 2005), hypertension, diabetes type 2, dyslipidemia, TIA/CVA, syncope, bradycardia (s/p single chamber pacemaker implant in 2020), adrenal mass. She was last seen by Dr. Beverly Falcon in May 2021. She was previously on anticoagulation for her atrial fibrillation but due to a fall and brain bleed after having a stroke (in 2020), the anticoagulation was discontinued. Her last echo was done in Oct. 2019 (while living in [place]) and it showed an EF of 70%, mild concentric LVH, mild pulmonary hypertension with RVSP of 41 mmHg. Denies chest pain, tightness, heaviness, and palpitations. Denies shortness of breath at rest, admits to dyspnea on exertion, orthopnea and PND. Denies abdominal bloating. Denies lightheadedness, dizziness, and syncope. Admits to some lower extremity edema and denies claudication. Denies nausea, vomiting, diarrhea, melena, hematochezia. Denies hematuria, urgency, frequency. Denies fever, chills. PMH:  Past Medical History:   Diagnosis Date    Adrenal mass ([address]     benign on serial f/u by CT    Allergic rhinitis 10/30/2012    Atrial fibrillation ([address] 1/14/2011    CAD (coronary artery disease), native coronary artery     Calculus of kidney     Dr. Riley [facility])     left breast carcinoma    Coronary stent 9/2005    drug eluting stent to proximal LAD    Diabetes mellitus ([address]     microalbuminuria    Dyslipidemia     History of echocardiogram 11/30/2009    Tech diff. A-Fib. EF 60-65%. Mild conc LVH. Mild LAE. Mild MR.   Mod RVE.  Mod TR.  RVSP 45-50.  History of myocardial perfusion scan 08/12/2013    No ischemia or prior infarction. Very sm area of apical thinning. Hyperdynamic LV function. EF >80%. Equivocally positive EKG on pharm stress test.  Low risk.  Hypertension     Osteopenia     DEXA -1.4 spine, -1.5 hip w FRAX 11 and 2.2 from 6/11    Pulmonary hypertension, secondary     S/P cardiac cath 08/29/2005    LM normal.  mLAD 100%. mCx 25%. PDA 30%. RCA mild. EF 60-65%. AA mildly dilated. PSH:  Past Surgical History:   Procedure Laterality Date    HX CORONARY STENT PLACEMENT      HX GI  03/20/08    Laproscopic low anterior resection Dr. Jonathon Anguiano  02/2007    left modified     HX UROLOGICAL  6/2/2016    [facility], Dr. Pilar Power, Cystoscopy, retrograde ureteroscopy, double-J catheter    ID CARDIAC SURG PROCEDURE UNLIST  9/1/2005    coronary angiography stent deplyoment    ID CARDIAC SURG PROCEDURE UNLIST  8/13    thallium negative ef 80%    ID COLONOSCOPY FLX DX W/COLLJ SPEC WHEN PFRMD  2008    s/p LHC for adenoma Dr. Anya Rojas INS NEW/RPLC [facility] W/TRANSV ELTRD VENTR Left 3/20/2020    Insert Ppm Single Ventricular performed by Odilon Max MD at [facility] CATH LAB       MEDS:  Current Outpatient Medications   Medication Sig    aspirin delayed-release 81 mg tablet Take 81 mg by mouth daily.  COQ10, LIPOSOMAL UBIQUINOL, PO Take 200 mg by mouth daily.  Lactobacillus acidophilus (PROBIOTIC ACIDOPHILUS PO) Take 1.5 billion cell by mouth daily.  oxybutynin chloride XL (DITROPAN XL) 10 mg CR tablet Take 30 mg by mouth daily. Take 3 tablets by mouth daily    ferrous sulfate (SLOW FE) 142 mg (45 mg iron) ER tablet Take 45 mg by mouth Daily (before breakfast).  loperamide (Imodium A-D) 2 mg capsule Take 2 mg by mouth three (3) times daily as needed for Diarrhea.     LORazepam (ATIVAN) 0.5 mg tablet Take 0.5 mg by mouth every eight to twelve (8-12) hours as needed for Anxiety.  carvediloL (COREG) 6.25 mg tablet Take 1 Tab by mouth two (2) times daily (with meals). Indications: high blood pressure, ventricular rate control in atrial fibrillation    acetaminophen (TYLENOL) 325 mg tablet Take 2 Tabs by mouth every four (4) hours as needed for Pain.  levothyroxine (SYNTHROID) 88 mcg tablet Take  by mouth Daily (before breakfast).  escitalopram oxalate (LEXAPRO) 10 mg tablet Take 10 mg by mouth daily as needed.  atorvastatin (LIPITOR) 40 mg tablet Take 1 Tab by mouth daily.  budesonide-formoterol (SYMBICORT) 160-4.5 mcg/Actuation HFA inhaler Take 2 Puffs by inhalation as needed.  albuterol (VENTOLIN HFA) 90 mcg/Actuation inhaler Take 1 Puff by inhalation as needed. No current facility-administered medications for this visit. Allergies and Sensitivities:  Allergies   Allergen Reactions    Amiodarone Other (comments)     hallucinations    Codeine Unable to Obtain    Sulfa (Sulfonamide Antibiotics) Unable to Obtain       Family History:  Family History   Problem Relation Age of Onset    Pacemaker Mother     Cancer Brother         prostate    Heart Disease Brother        Social History:  She  reports that she has never smoked. She has never used smokeless tobacco.  She  reports no history of alcohol use. Physical:  Visit Vitals  /66 (BP 1 Location: Right arm, BP Patient Position: Sitting, BP Cuff Size: Small adult)   Pulse 62   Ht 5' 2\" (1.575 m)   Wt 82.6 kg (182 lb)   SpO2 98%   BMI 33.29 kg/m²         Exam:  Neck:  Supple, no JVD, no carotid bruits  CV:  Normal S1 and  S2, no murmurs, rubs, or gallops noted  Lungs:  Clear to ausculation throughout, no wheezes or rales  Abd:  Soft, non-tender, non-distended with good bowel sounds.   No hepatosplenomegaly  Extremities:  2+ pitting edema around her ankles, left greater than right      Data:  EKG: not done today (EKG done in the ER on 6/7/22)      LABS:  Lab Results   Component Value Date/Time    Sodium 138 06/07/2022 12:01 PM    Potassium 4.2 06/07/2022 12:01 PM    Chloride 104 06/07/2022 12:01 PM    CO2 32 06/07/2022 12:01 PM    Glucose 138 (H) 06/07/2022 12:01 PM    BUN 13 06/07/2022 12:01 PM    Creatinine 1.07 06/07/2022 12:01 PM     Lab Results   Component Value Date/Time    Cholesterol, total 83 05/31/2016 02:10 AM    HDL Cholesterol 40 05/31/2016 02:10 AM    LDL, calculated 17.4 05/31/2016 02:10 AM    Triglyceride 128 05/31/2016 02:10 AM    CHOL/HDL Ratio 2.1 05/31/2016 02:10 AM     Lab Results   Component Value Date/Time    ALT (SGPT) 20 03/11/2020 11:15 PM         Impression/Plan:  1. Shortness of breath, increasing over the past several months  2. Persistent atrial fibrillation, rate controlled and not on anticoagulation due to history of brain bleed  3. History of TIA/CVA  4. Sick sinus syndrome, s/p single chamber pacemaker implant in 2020  5. Hypertension, blood pressure controlled  6. Dyslipidemia, on atorvastatin 40mg  7. Diabetes mellitus, recommend Hgb A1c less than 7% from cardiac standpoint    Ms. Cottrell was seen today for a post-ER follow-up of complaints of shortness of breath. She states that it has been worsening over the past several months. She denies chest pain, unusual fatigue. She denies orthopnea and PND. She was accompanied by her daughter who states that they were not aware that the ER set up this appointment. ER evaluation was negative and she was instructed to follow-up with cardiology. A NT pro-BNP was not checked during her ER visit. Her breath sounds are clear, no wheezing or rales. She has 2+ pitting edema around her ankles, left greater than right. Will start her on lasix 20mg and potassium 10meq once a day. Printed prescription given to daughter to give to the facility where her mother lives. She is a resident an an Assisted Living facility. I asked that weigh her daily if possible.   She was given a lab slip for a BMP and NT pro-BNP to be done on 6/20/22 and she will follow-up with me in 2 weeks. Her last echo was done in 2019 and it showed mild pulmonary hypertension with RVSP of 41 mmHg. Will request that she have an echocardiogram done to evaluate her EF and to see if pulmonary hypertension as worsened. She will follow-up with Dr. Shahnaz Garcia as scheduled and as needed. Time spent:  30 minutes      Scooter SAWYER, FNP-BC    Please note:  Portions of this chart were created with Dragon medical speech to text program.  Unrecognized errors may be present.

## 2022-06-18 NOTE — PROGRESS NOTES
[address] [facility]. presents today for follow-up of shortness of breath. When seen 2 weeks ago, she was started on lasix 20mg and potassium 10meq daily for complaints of FERRIS and lower extremity edema. She presented to the [facility] 6/7/22, with complaints of exertional shortness of breath occurring for the past 3-4 months. Chest x-ray was negative for cardiopulmonary processes, CTA of the chest was negative for PE. Her troponins were with normal range at 26 x 2 sets. She states that she has been voiding often and her lower extremity edema has improved. She does not appear as short of breath. Her granddaughter accompanied her today and she states that her mother, Mrs. Cottrell's daughter, is trying to get her scheduled to see a pulmonologist.  She was started on Allegra and an albuterol inhaler at the [facility] [address] , since I last saw her. Labs done on 6/20/22 showed potassium of 4.8, BUN 15, Creat 1.0, NT pro- (normal 0 to 1800). She is an 80year old female with history of persistent atrial fibrillation, CAD (s/p PCI LAD 2005), hypertension, diabetes type 2, dyslipidemia, TIA/CVA, syncope, bradycardia (s/p single chamber pacemaker implant in 2020), adrenal mass. She states that she also had asthma in the past.  She was last seen by Dr. Jose Juan Moura in May 2021. She was previously on anticoagulation for her atrial fibrillation but due to a fall and brain bleed after having a stroke (in 2020), the anticoagulation was discontinued. Her last echo was done in Oct. 2019 (while living in [place]) and it showed an EF of 70%, mild concentric LVH, mild pulmonary hypertension with RVSP of 41 mmHg. Denies chest pain, tightness, heaviness, and palpitations. Denies shortness of breath at rest, admits to dyspnea on exertion (improved), orthopnea and PND. Denies abdominal bloating. Denies lightheadedness, dizziness, and syncope. Admits to some lower extremity edema (improved) and denies claudication.   Denies nausea, vomiting, diarrhea, melena, hematochezia. Denies hematuria, urgency, frequency. Denies fever, chills. PMH:  Past Medical History:   Diagnosis Date    Adrenal mass ([facility]     benign on serial f/u by CT    Allergic rhinitis 10/30/2012    Atrial fibrillation ([facility] 1/14/2011    CAD (coronary artery disease), native coronary artery     Calculus of kidney     Dr. Lily Hernandez [facility])     left breast carcinoma    Coronary stent 9/2005    drug eluting stent to proximal LAD    Diabetes mellitus ([facility]     microalbuminuria    Dyslipidemia     History of echocardiogram 11/30/2009    Tech diff. A-Fib. EF 60-65%. Mild conc LVH. Mild LAE. Mild MR. Mod RVE. Mod TR.  RVSP 45-50.  History of myocardial perfusion scan 08/12/2013    No ischemia or prior infarction. Very sm area of apical thinning. Hyperdynamic LV function. EF >80%. Equivocally positive EKG on pharm stress test.  Low risk.  Hypertension     Osteopenia     DEXA -1.4 spine, -1.5 hip w FRAX 11 and 2.2 from 6/11    Pulmonary hypertension, secondary     S/P cardiac cath 08/29/2005    LM normal.  mLAD 100%. mCx 25%. PDA 30%. RCA mild. EF 60-65%. AA mildly dilated.         PSH:  Past Surgical History:   Procedure Laterality Date    HX CORONARY STENT PLACEMENT      HX GI  03/20/08    Laproscopic low anterior resection Dr. Newton Terrazas  02/2007    left modified     HX UROLOGICAL  6/2/2016    [facility], Dr. Janine Sloan, Cystoscopy, retrograde ureteroscopy, double-J catheter    ID CARDIAC SURG PROCEDURE UNLIST  9/1/2005    coronary angiography stent deplyoment    ID CARDIAC SURG PROCEDURE UNLIST  8/13    thallium negative ef 80%    ID COLONOSCOPY FLX DX W/COLLJ SPEC WHEN PFRMD  2008    s/p C for adenoma Dr. Yisel Shah INS NEW/RPLC [facility] W/TRANSV ELTRD VENTR Left 3/20/2020    Insert Ppm Single Ventricular performed by Reed Ozuna MD at [facility] CATH LAB       MEDS:  Current Outpatient Medications   Medication Sig    aspirin delayed-release 81 mg tablet Take 81 mg by mouth daily.  COQ10, LIPOSOMAL UBIQUINOL, PO Take 200 mg by mouth daily.  Lactobacillus acidophilus (PROBIOTIC ACIDOPHILUS PO) Take 1.5 billion cell by mouth daily.  oxybutynin chloride XL (DITROPAN XL) 10 mg CR tablet Take 30 mg by mouth daily. Take 3 tablets by mouth daily    ferrous sulfate (SLOW FE) 142 mg (45 mg iron) ER tablet Take 45 mg by mouth Daily (before breakfast).  loperamide (Imodium A-D) 2 mg capsule Take 2 mg by mouth three (3) times daily as needed for Diarrhea.  LORazepam (ATIVAN) 0.5 mg tablet Take 0.5 mg by mouth every eight to twelve (8-12) hours as needed for Anxiety.  furosemide (LASIX) 20 mg tablet Take 1 Tablet by mouth daily.  potassium chloride SA (KLOR-CON M10) 10 mEq tablet Take 1 Tablet by mouth daily.  carvediloL (COREG) 6.25 mg tablet Take 1 Tab by mouth two (2) times daily (with meals). Indications: high blood pressure, ventricular rate control in atrial fibrillation    acetaminophen (TYLENOL) 325 mg tablet Take 2 Tabs by mouth every four (4) hours as needed for Pain.  levothyroxine (SYNTHROID) 88 mcg tablet Take  by mouth Daily (before breakfast).  escitalopram oxalate (LEXAPRO) 10 mg tablet Take 10 mg by mouth daily as needed.  atorvastatin (LIPITOR) 40 mg tablet Take 1 Tab by mouth daily.  budesonide-formoterol (SYMBICORT) 160-4.5 mcg/Actuation HFA inhaler Take 2 Puffs by inhalation as needed.  albuterol (VENTOLIN HFA) 90 mcg/Actuation inhaler Take 1 Puff by inhalation as needed. No current facility-administered medications for this visit.        Allergies and Sensitivities:  Allergies   Allergen Reactions    Amiodarone Other (comments)     hallucinations    Codeine Unable to Obtain    Sulfa (Sulfonamide Antibiotics) Unable to Obtain       Family History:  Family History   Problem Relation Age of Onset    Pacemaker Mother     Cancer Brother         prostate    Heart Disease Brother        Social History:  She  reports that she has never smoked. She has never used smokeless tobacco.  She  reports no history of alcohol use. Physical:  Visit Vitals  /70 (BP 1 Location: Left upper arm, BP Patient Position: Sitting, BP Cuff Size: Adult)   Pulse 87   Ht 5' 2\" (1.575 m)   Wt 81.2 kg (179 lb)   SpO2 97%   BMI 32.74 kg/m²     Her weight is down 3 pounds since her last visit      Exam:  Neck:  Supple, no JVD, no carotid bruits  CV:  Normal S1 and  S2, no murmurs, rubs, or gallops noted  Lungs:  Clear to ausculation throughout, no wheezes or rales  Abd:  Soft, non-tender, non-distended with good bowel sounds. No hepatosplenomegaly  Extremities:  1+ pitting edema around her ankles, left greater than right      Data:  EKG: not done today (EKG done in the ER on 6/7/22)      LABS:  Lab Results   Component Value Date/Time    Sodium 138 06/07/2022 12:01 PM    Potassium 4.2 06/07/2022 12:01 PM    Chloride 104 06/07/2022 12:01 PM    CO2 32 06/07/2022 12:01 PM    Glucose 138 (H) 06/07/2022 12:01 PM    BUN 13 06/07/2022 12:01 PM    Creatinine 1.07 06/07/2022 12:01 PM     Lab Results   Component Value Date/Time    Cholesterol, total 83 05/31/2016 02:10 AM    HDL Cholesterol 40 05/31/2016 02:10 AM    LDL, calculated 17.4 05/31/2016 02:10 AM    Triglyceride 128 05/31/2016 02:10 AM    CHOL/HDL Ratio 2.1 05/31/2016 02:10 AM     Lab Results   Component Value Date/Time    ALT (SGPT) 20 03/11/2020 11:15 PM         Impression/Plan:  1. Shortness of breath, slightly improved  2. Persistent atrial fibrillation, rate controlled and not on anticoagulation due to history of brain bleed  3. History of TIA/CVA  4. Sick sinus syndrome, s/p single chamber pacemaker implant in 2020  5. Hypertension, blood pressure controlled  6. Dyslipidemia, on atorvastatin 40mg  7. Diabetes mellitus, recommend Hgb A1c less than 7% from cardiac standpoint    Ms. Cottrell was seen today for follow-up of complaints of shortness of breath. She was started on lasix 20mg and potassium 10meq daily. She was given a lab slip for a BMP and NT pro-BNP trino done prior to returning today for follow-up and it was completed on 6/20/22. Her NT pro-BNP was only 999 (within normal range). She states that she is feeling somewhat better and she does not appear as short of breath. Her lower extremity edema has improved and she is diuresing well. Her weight is down 3 pounds. Breath sounds are clear. For now, will continue lasix and potassium as prescribed. No other changes were made to her medication regimen. Her echo is scheduled for July 19, 2022. Her last echo was done in 2019 and it showed mild pulmonary hypertension with RVSP of 41 mmHg. Her last device check was done in March 2022 and it was functioning appropriately. Her daughter is trying to get her in to see a pulmonologist as she reportedly had asthma in the past.  Her echo will evaluate for pulmonary hypertension as well. She will follow-up with Dr. Kathryn Roman as scheduled and as needed. Reuben Dubin MSN, FNP-BC    Please note:  Portions of this chart were created with Dragon medical speech to text program.  Unrecognized errors may be present.

## 2022-06-20 ENCOUNTER — HOSPITAL ENCOUNTER (OUTPATIENT)
Dept: LAB | Age: 87
Discharge: HOME | End: 2022-06-20
Payer: MEDICARE

## 2022-06-20 DIAGNOSIS — I25.10 CORONARY ARTERY DISEASE INVOLVING NATIVE CORONARY ARTERY OF NATIVE HEART WITHOUT ANGINA PECTORIS: ICD-10-CM

## 2022-06-20 DIAGNOSIS — R06.02 SHORTNESS OF BREATH: ICD-10-CM

## 2022-06-20 LAB
ANION GAP SERPL CALC-SCNC: 6 MMOL/L (ref 3–18)
BNP SERPL-MCNC: 999 PG/ML (ref 0–1800)
BUN SERPL-MCNC: 15 MG/DL (ref 7–18)
BUN/CREAT SERPL: 15 (ref 12–20)
CALCIUM SERPL-MCNC: 9.7 MG/DL (ref 8.5–10.1)
CHLORIDE SERPL-SCNC: 100 MMOL/L (ref 100–111)
CO2 SERPL-SCNC: 31 MMOL/L (ref 21–32)
CREAT SERPL-MCNC: 1.02 MG/DL (ref 0.6–1.3)
GLUCOSE SERPL-MCNC: 143 MG/DL (ref 74–99)
POTASSIUM SERPL-SCNC: 4.8 MMOL/L (ref 3.5–5.5)
SODIUM SERPL-SCNC: 137 MMOL/L (ref 136–145)

## 2022-06-20 PROCEDURE — 36415 COLL VENOUS BLD VENIPUNCTURE: CPT

## 2022-06-20 PROCEDURE — 83880 ASSAY OF NATRIURETIC PEPTIDE: CPT

## 2022-06-20 PROCEDURE — 80048 BASIC METABOLIC PNL TOTAL CA: CPT

## 2022-06-22 ENCOUNTER — OFFICE VISIT (OUTPATIENT)
Dept: CARDIOLOGY CLINIC | Age: 87
End: 2022-06-22
Payer: MEDICARE

## 2022-06-22 VITALS
HEIGHT: 62 IN | HEART RATE: 87 BPM | BODY MASS INDEX: 32.94 KG/M2 | DIASTOLIC BLOOD PRESSURE: 70 MMHG | WEIGHT: 179 LBS | SYSTOLIC BLOOD PRESSURE: 134 MMHG | OXYGEN SATURATION: 97 %

## 2022-06-22 DIAGNOSIS — R06.02 SHORTNESS OF BREATH: Primary | ICD-10-CM

## 2022-06-22 DIAGNOSIS — Z95.0 PACEMAKER: ICD-10-CM

## 2022-06-22 DIAGNOSIS — I49.5 SICK SINUS SYNDROME (HCC): ICD-10-CM

## 2022-06-22 PROCEDURE — 1101F PT FALLS ASSESS-DOCD LE1/YR: CPT | Performed by: NURSE PRACTITIONER

## 2022-06-22 PROCEDURE — G8432 DEP SCR NOT DOC, RNG: HCPCS | Performed by: NURSE PRACTITIONER

## 2022-06-22 PROCEDURE — 1123F ACP DISCUSS/DSCN MKR DOCD: CPT | Performed by: NURSE PRACTITIONER

## 2022-06-22 PROCEDURE — G8536 NO DOC ELDER MAL SCRN: HCPCS | Performed by: NURSE PRACTITIONER

## 2022-06-22 PROCEDURE — G8427 DOCREV CUR MEDS BY ELIG CLIN: HCPCS | Performed by: NURSE PRACTITIONER

## 2022-06-22 PROCEDURE — 99213 OFFICE O/P EST LOW 20 MIN: CPT | Performed by: NURSE PRACTITIONER

## 2022-06-22 PROCEDURE — G8417 CALC BMI ABV UP PARAM F/U: HCPCS | Performed by: NURSE PRACTITIONER

## 2022-06-22 PROCEDURE — 1090F PRES/ABSN URINE INCON ASSESS: CPT | Performed by: NURSE PRACTITIONER

## 2022-06-22 RX ORDER — AMMONIUM LACTATE 12 G/100G
LOTION TOPICAL AS NEEDED
COMMUNITY

## 2022-06-22 RX ORDER — MINERAL OIL
180 ENEMA (ML) RECTAL 2 TIMES DAILY
COMMUNITY
Start: 2022-06-13

## 2022-06-22 RX ORDER — NYSTATIN 100000 U/G
CREAM TOPICAL 2 TIMES DAILY
COMMUNITY
Start: 2022-04-26

## 2022-06-22 NOTE — PROGRESS NOTES
Jimmy Stephens presents today for   Chief Complaint   Patient presents with    Follow-up     2 week after being started on lasix    Leg Swelling     both legs, feet, and ankles     Shortness of Breath       Jimmy Stephens preferred language for health care discussion is english/other. Is someone accompanying this pt? yes    Is the patient using any DME equipment during [address]? walker    Depression Screening:  3 most recent PHQ Screens 6/22/2022   Little interest or pleasure in doing things Not at all   Feeling down, depressed, irritable, or hopeless Not at all   Total Score PHQ 2 0       Learning Assessment:  Learning Assessment 6/22/2022   PRIMARY LEARNER Patient   HIGHEST LEVEL OF EDUCATION - PRIMARY LEARNER  -   BARRIERS PRIMARY LEARNER -   CO-LEARNER CAREGIVER -   PRIMARY LANGUAGE ENGLISH   LEARNER PREFERENCE PRIMARY DEMONSTRATION   ANSWERED BY patient   RELATIONSHIP SELF       Abuse Screening:  Abuse Screening Questionnaire 6/22/2022   Do you ever feel afraid of your partner? N   Are you in a relationship with someone who physically or mentally threatens you? N   Is it safe for you to go home? Y       Fall Risk  Fall Risk Assessment, last 12 mths 6/22/2022   Able to walk? Yes   Fall in past 12 months? 0   Do you feel unsteady? 0   Are you worried about falling 0           Pt currently taking Anticoagulant therapy? no    Pt currently taking Antiplatelet therapy ? Aspirin 81 mg daily      Coordination of Care:  1. Have you been to the ER, urgent care clinic since your last visit? Hospitalized since your last visit? no    2. Have you seen or consulted any other health care providers outside of the [address] [place] [address] since your last visit? Include any pap smears or colon screening.  no

## 2022-06-22 NOTE — PATIENT INSTRUCTIONS
Continue present medication regimen  Lab work looks good  Follow-up with Dr. Nicolina Landau as scheduled and as needed (see appointment that was made and if not a good date or time, please call the office and change it)

## 2022-07-19 ENCOUNTER — HOSPITAL ENCOUNTER (OUTPATIENT)
Dept: NON INVASIVE DIAGNOSTICS | Age: 87
Discharge: HOME | End: 2022-07-19
Attending: NURSE PRACTITIONER
Payer: MEDICARE

## 2022-07-19 VITALS
BODY MASS INDEX: 33.49 KG/M2 | DIASTOLIC BLOOD PRESSURE: 70 MMHG | HEIGHT: 62 IN | SYSTOLIC BLOOD PRESSURE: 134 MMHG | WEIGHT: 182 LBS

## 2022-07-19 DIAGNOSIS — R06.02 SHORTNESS OF BREATH: ICD-10-CM

## 2022-07-19 DIAGNOSIS — I25.10 CORONARY ARTERY DISEASE INVOLVING NATIVE CORONARY ARTERY OF NATIVE HEART WITHOUT ANGINA PECTORIS: ICD-10-CM

## 2022-07-19 LAB
ECHO AO ASC DIAM: 4.2 CM
ECHO AO ASCENDING AORTA INDEX: 2.28 CM/M2
ECHO AO ROOT DIAM: 3.4 CM
ECHO AO ROOT INDEX: 1.85 CM/M2
ECHO EST RA PRESSURE: 8 MMHG
ECHO LA VOL 2C: 50 ML (ref 22–52)
ECHO LA VOL 4C: 57 ML (ref 22–52)
ECHO LA VOLUME AREA LENGTH: 57 ML
ECHO LA VOLUME INDEX A2C: 27 ML/M2 (ref 16–34)
ECHO LA VOLUME INDEX A4C: 31 ML/M2 (ref 16–34)
ECHO LA VOLUME INDEX AREA LENGTH: 31 ML/M2 (ref 16–34)
ECHO LV E' LATERAL VELOCITY: 8 CM/S
ECHO LVOT AREA: 2.8 CM2
ECHO LVOT DIAM: 1.9 CM
ECHO LVOT MEAN GRADIENT: 2 MMHG
ECHO LVOT PEAK GRADIENT: 4 MMHG
ECHO LVOT PEAK VELOCITY: 1 M/S
ECHO LVOT STROKE VOLUME INDEX: 29.6 ML/M2
ECHO LVOT SV: 54.4 ML
ECHO LVOT VTI: 19.2 CM
ECHO RIGHT VENTRICULAR SYSTOLIC PRESSURE (RVSP): 46 MMHG
ECHO RV TAPSE: 1.6 CM (ref 1.7–?)
ECHO TV REGURGITANT MAX VELOCITY: 3.1 M/S
ECHO TV REGURGITANT PEAK GRADIENT: 38 MMHG

## 2022-07-19 PROCEDURE — 93306 TTE W/DOPPLER COMPLETE: CPT | Performed by: INTERNAL MEDICINE

## 2022-07-19 PROCEDURE — 93306 TTE W/DOPPLER COMPLETE: CPT

## 2022-10-20 RX ORDER — POTASSIUM CHLORIDE 750 MG/1
CAPSULE, EXTENDED RELEASE ORAL
Qty: 30 CAPSULE | Refills: 11 | Status: SHIPPED | OUTPATIENT
Start: 2022-10-20

## 2022-10-20 RX ORDER — FUROSEMIDE 20 MG/1
TABLET ORAL
Qty: 30 TABLET | Refills: 11 | Status: SHIPPED | OUTPATIENT
Start: 2022-10-20

## 2022-11-04 ENCOUNTER — HOSPITAL ENCOUNTER (EMERGENCY)
Age: 87
Discharge: HOME | End: 2022-11-04
Attending: EMERGENCY MEDICINE
Payer: MEDICARE

## 2022-11-04 ENCOUNTER — APPOINTMENT (OUTPATIENT)
Dept: GENERAL RADIOLOGY | Age: 87
End: 2022-11-04
Attending: PHYSICIAN ASSISTANT
Payer: MEDICARE

## 2022-11-04 VITALS
TEMPERATURE: 98.7 F | HEART RATE: 74 BPM | OXYGEN SATURATION: 98 % | DIASTOLIC BLOOD PRESSURE: 72 MMHG | SYSTOLIC BLOOD PRESSURE: 147 MMHG | RESPIRATION RATE: 18 BRPM

## 2022-11-04 DIAGNOSIS — M25.512 ACUTE PAIN OF LEFT SHOULDER: Primary | ICD-10-CM

## 2022-11-04 PROCEDURE — 99284 EMERGENCY DEPT VISIT MOD MDM: CPT

## 2022-11-04 PROCEDURE — 93005 ELECTROCARDIOGRAM TRACING: CPT

## 2022-11-04 PROCEDURE — 73030 X-RAY EXAM OF SHOULDER: CPT

## 2022-11-04 PROCEDURE — 74011250637 HC RX REV CODE- 250/637: Performed by: PHYSICIAN ASSISTANT

## 2022-11-04 RX ORDER — ACETAMINOPHEN 500 MG
1000 TABLET ORAL
Status: COMPLETED | OUTPATIENT
Start: 2022-11-04 | End: 2022-11-04

## 2022-11-04 RX ADMIN — ACETAMINOPHEN 1000 MG: 500 TABLET ORAL at 12:36

## 2022-11-04 NOTE — ED TRIAGE NOTES
Pt arrived via EMS with c/o of chronic left arm pain. Upon arrival pt denies any pain to the arm or any complaints at this time.

## 2022-11-04 NOTE — ED PROVIDER NOTES
EMERGENCY DEPARTMENT HISTORY AND PHYSICAL EXAM    Date: 11/4/2022  Patient Name: Conner Cottrell    History of Presenting Illness     Chief Complaint   Patient presents with    Arm Pain         History Provided By: Patient    Chief Complaint: Left shoulder pain  Duration: Years  Timing: Constant  Location: Left shoulder  Quality: Hurts  Severity: Moderate  Modifying Factors: None  Associated Symptoms: none       Additional History (Context): Arya Echevarria is a 80 y.o. female with a history of hypertension, A. fib, CAD who presents today for issues listed above. Patient reports she is here for left shoulder pain. Reports that she believes the pain is from a prior injury many years ago. Denies any surgical intervention. Patient reports she does typically get Tylenol at [facility] where she lives however reports they did not give her any today. Patient reports she feels otherwise well and has no real complaints. PCP: Bradford Cheng MD    Current Outpatient Medications   Medication Sig Dispense Refill    furosemide (LASIX) 20 mg tablet TAKE 1 TABLET BY MOUTH ONCE DAILY FOR EDEMA 30 Tablet 11    potassium chloride SA (MICRO-K) 10 mEq capsule TAKE 1 CAPSULE BY MOUTH ONCE DAILY FOR LOW POTASSIUM 30 Capsule 11    fexofenadine (ALLEGRA) 180 mg tablet Take 180 mg by mouth two (2) times a day. nystatin (MYCOSTATIN) topical cream Apply  to affected area two (2) times a day. ammonium lactate (LAC-HYDRIN) 12 % lotion Apply  to affected area as needed. rub in to affected area well      aspirin delayed-release 81 mg tablet Take 81 mg by mouth daily. COQ10, LIPOSOMAL UBIQUINOL, PO Take 200 mg by mouth daily. Lactobacillus acidophilus (PROBIOTIC ACIDOPHILUS PO) Take 1.5 billion cell by mouth daily. oxybutynin chloride XL (DITROPAN XL) 10 mg CR tablet Take 30 mg by mouth daily.  Take 3 tablets by mouth daily      ferrous sulfate (SLOW FE) 142 mg (45 mg iron) ER tablet Take 45 mg by mouth Daily (before breakfast). (Patient not taking: Reported on 6/22/2022)      loperamide (Imodium A-D) 2 mg capsule Take 2 mg by mouth three (3) times daily as needed for Diarrhea. LORazepam (ATIVAN) 0.5 mg tablet Take 0.5 mg by mouth every eight to twelve (8-12) hours as needed for Anxiety. potassium chloride SA (KLOR-CON M10) 10 mEq tablet Take 1 Tablet by mouth daily. 30 Tablet 4    carvediloL (COREG) 6.25 mg tablet Take 1 Tab by mouth two (2) times daily (with meals). Indications: high blood pressure, ventricular rate control in atrial fibrillation 60 Tab 3    acetaminophen (TYLENOL) 325 mg tablet Take 2 Tabs by mouth every four (4) hours as needed for Pain. 20 Tab 0    levothyroxine (SYNTHROID) 88 mcg tablet Take  by mouth Daily (before breakfast). escitalopram oxalate (LEXAPRO) 10 mg tablet Take 10 mg by mouth daily as needed. atorvastatin (LIPITOR) 40 mg tablet Take 1 Tab by mouth daily. 90 Tab 3    budesonide-formoterol (SYMBICORT) 160-4.5 mcg/Actuation HFA inhaler Take 2 Puffs by inhalation as needed. albuterol (VENTOLIN HFA) 90 mcg/Actuation inhaler Take 1 Puff by inhalation as needed. Past History     Past Medical History:  Past Medical History:   Diagnosis Date    Adrenal mass ([facility] [address]     benign on serial f/u by CT    Allergic rhinitis 10/30/2012    Atrial fibrillation ([facility] [address] 1/14/2011    CAD (coronary artery disease), native coronary artery     Calculus of kidney     Dr. Kaye Singh [facility])     left breast carcinoma    Coronary stent 9/2005    drug eluting stent to proximal LAD    Diabetes mellitus ([facility] [address]     microalbuminuria    Dyslipidemia     History of echocardiogram 11/30/2009    Tech diff. A-Fib. EF 60-65%. Mild conc LVH. Mild LAE. Mild MR. Mod RVE. Mod TR.  RVSP 45-50. History of myocardial perfusion scan 08/12/2013    No ischemia or prior infarction. Very sm area of apical thinning. Hyperdynamic LV function. EF >80%.   Equivocally positive EKG on pharm stress test.  Low risk. Hypertension     Osteopenia     DEXA -1.4 spine, -1.5 hip w FRAX 11 and 2.2 from 6/11    Pulmonary hypertension, secondary     S/P cardiac cath 08/29/2005    LM normal.  mLAD 100%. mCx 25%. PDA 30%. RCA mild. EF 60-65%. AA mildly dilated. Past Surgical History:  Past Surgical History:   Procedure Laterality Date    HX CORONARY STENT PLACEMENT      HX GI  03/20/08    Laproscopic low anterior resection Dr. Brenna Santana  02/2007    left modified     HX UROLOGICAL  6/2/2016    [facility], Dr. Franci Marrufo, Cystoscopy, retrograde ureteroscopy, double-J catheter    IN CARDIAC SURG PROCEDURE UNLIST  9/1/2005    coronary angiography stent deplyoment    IN CARDIAC SURG PROCEDURE UNLIST  8/13    thallium negative ef 80%    IN COLONOSCOPY FLX DX W/COLLJ SPEC WHEN PFRMD  2008    s/p LHC for adenoma Dr. Андрей Maher INS NEW/RPLC PRM PACEMAKER W/TRANSV ELTRD VENTR Left 3/20/2020    Insert Ppm Single Ventricular performed by Vladimir Van MD at [address] [facility]       Family History:  Family History   Problem Relation Age of Onset    Pacemaker Mother     Cancer Brother         prostate    Heart Disease Brother        Social History:  Social History     Tobacco Use    Smoking status: Never    Smokeless tobacco: Never   Vaping Use    Vaping Use: Never used   Substance Use Topics    Alcohol use: No    Drug use: No       Allergies: Allergies   Allergen Reactions    Amiodarone Other (comments)     hallucinations    Codeine Unable to Obtain    Sulfa (Sulfonamide Antibiotics) Unable to Obtain         Review of Systems   Review of Systems   Constitutional:  Negative for chills and fever. HENT:  Negative for congestion, rhinorrhea and sore throat. Respiratory:  Negative for cough and shortness of breath. Cardiovascular:  Negative for chest pain. Gastrointestinal:  Negative for abdominal pain, blood in stool, constipation, diarrhea, nausea and vomiting. Genitourinary:  Negative for dysuria, frequency and hematuria. Musculoskeletal:  Positive for arthralgias. Negative for back pain and myalgias. Skin:  Negative for rash and wound. Neurological:  Negative for dizziness and headaches. All other systems reviewed and are negative. All Other Systems Negative  Physical Exam     Vitals:    11/04/22 1200   BP: (!) 147/72   Pulse: 74   Resp: 18   Temp: 98.7 °F (37.1 °C)   SpO2: 98%     Physical Exam  Vitals and nursing note reviewed. Constitutional:       General: She is not in acute distress. Appearance: She is well-developed. She is not diaphoretic. HENT:      Head: Normocephalic and atraumatic. Eyes:      Conjunctiva/sclera: Conjunctivae normal.   Cardiovascular:      Rate and Rhythm: Normal rate and regular rhythm. Heart sounds: Normal heart sounds. Pulmonary:      Effort: Pulmonary effort is normal. No respiratory distress. Breath sounds: Normal breath sounds. Chest:      Chest wall: No tenderness. Abdominal:      General: Bowel sounds are normal. There is no distension. Palpations: Abdomen is soft. Tenderness: There is no abdominal tenderness. There is no guarding or rebound. Musculoskeletal:         General: No deformity. Left shoulder: Normal. No tenderness or bony tenderness. Normal range of motion. Left upper arm: Normal.      Left elbow: Normal.      Left forearm: Normal.      Cervical back: Normal range of motion and neck supple. Skin:     General: Skin is warm and dry. Neurological:      General: No focal deficit present. Mental Status: She is alert and oriented to person, place, and time. GCS: GCS eye subscore is 4. GCS verbal subscore is 5. GCS motor subscore is 6. Motor: Motor function is intact. Coordination: Coordination is intact. Deep Tendon Reflexes: Reflexes are normal and symmetric.          Diagnostic Study Results     Labs -   No results found for this or any previous visit (from the past 12 hour(s)). Radiologic Studies -   XR SHOULDER LT AP/LAT MIN 2 V    (Results Pending)     CT Results  (Last 48 hours)      None          CXR Results  (Last 48 hours)      None              Medical Decision Making   I am the first provider for this patient. I reviewed the vital signs, available nursing notes, past medical history, past surgical history, family history and social history. Vital Signs-Reviewed the patient's vital signs. Records Reviewed: Nursing Notes and Old Medical Records     Procedures: None   Procedures    Provider Notes (Medical Decision Making):     Differential: fracture, dislocation, abrasion, sprain, contusion, laceration, OA, RA, gout, radiculopathy      Plan: Will order xrays and EKG out of precaution. Order dose Tylenol. 1:00 PM  Have discussed x-ray results with patient. Did discuss findings of arthritis. Have advised further evaluation and follow-up with orthopedics. Have advised patient to continue Tylenol at home for relief. Will discharge home. MED RECONCILIATION:  No current facility-administered medications for this encounter. Current Outpatient Medications   Medication Sig    furosemide (LASIX) 20 mg tablet TAKE 1 TABLET BY MOUTH ONCE DAILY FOR EDEMA    potassium chloride SA (MICRO-K) 10 mEq capsule TAKE 1 CAPSULE BY MOUTH ONCE DAILY FOR LOW POTASSIUM    fexofenadine (ALLEGRA) 180 mg tablet Take 180 mg by mouth two (2) times a day. nystatin (MYCOSTATIN) topical cream Apply  to affected area two (2) times a day. ammonium lactate (LAC-HYDRIN) 12 % lotion Apply  to affected area as needed. rub in to affected area well    aspirin delayed-release 81 mg tablet Take 81 mg by mouth daily. COQ10, LIPOSOMAL UBIQUINOL, PO Take 200 mg by mouth daily. Lactobacillus acidophilus (PROBIOTIC ACIDOPHILUS PO) Take 1.5 billion cell by mouth daily.     oxybutynin chloride XL (DITROPAN XL) 10 mg CR tablet Take 30 mg by mouth daily. Take 3 tablets by mouth daily    ferrous sulfate (SLOW FE) 142 mg (45 mg iron) ER tablet Take 45 mg by mouth Daily (before breakfast). (Patient not taking: Reported on 6/22/2022)    loperamide (Imodium A-D) 2 mg capsule Take 2 mg by mouth three (3) times daily as needed for Diarrhea. LORazepam (ATIVAN) 0.5 mg tablet Take 0.5 mg by mouth every eight to twelve (8-12) hours as needed for Anxiety. potassium chloride SA (KLOR-CON M10) 10 mEq tablet Take 1 Tablet by mouth daily. carvediloL (COREG) 6.25 mg tablet Take 1 Tab by mouth two (2) times daily (with meals). Indications: high blood pressure, ventricular rate control in atrial fibrillation    acetaminophen (TYLENOL) 325 mg tablet Take 2 Tabs by mouth every four (4) hours as needed for Pain.    levothyroxine (SYNTHROID) 88 mcg tablet Take  by mouth Daily (before breakfast). escitalopram oxalate (LEXAPRO) 10 mg tablet Take 10 mg by mouth daily as needed. atorvastatin (LIPITOR) 40 mg tablet Take 1 Tab by mouth daily. budesonide-formoterol (SYMBICORT) 160-4.5 mcg/Actuation HFA inhaler Take 2 Puffs by inhalation as needed. albuterol (VENTOLIN HFA) 90 mcg/Actuation inhaler Take 1 Puff by inhalation as needed. Disposition:  Home     DISCHARGE NOTE:   Pt has been reexamined. Patient has no new complaints, changes, or physical findings. Care plan outlined and precautions discussed. Results of workup were reviewed with the patient. All medications were reviewed with the patient. All of pt's questions and concerns were addressed. Patient was instructed and agrees to follow up with PCP/Ortho as well as to return to the ED upon further deterioration. Patient is ready to go home.     Follow-up Information       Follow up With Specialties Details Why Contact Info    [facility] DEPT Emergency Medicine  As needed [address] [place] [address] [address] [facility]    Patrick nEg MD Internal Medicine Physician Schedule an appointment as soon as possible for a visit   [address] [place] [phone]  [facility] - [facility] Orthopedic Surgery Schedule an appointment as soon as possible for a visit   [address], [address] [facility]  [phone]            Current Discharge Medication List              Diagnosis     Clinical Impression:   1. Acute pain of left shoulder          \"Please note that this dictation was completed with [email or web address], the computer voice recognition software. Quite often unanticipated grammatical, syntax, homophones, and other interpretive errors are inadvertently transcribed by the computer software. Please disregard these errors. Please excuse any errors that have escaped final proofreading. \"

## 2022-11-06 LAB
ATRIAL RATE: 115 BPM
CALCULATED R AXIS, ECG10: 130 DEGREES
CALCULATED T AXIS, ECG11: -54 DEGREES
DIAGNOSIS, 93000: NORMAL
Q-T INTERVAL, ECG07: 394 MS
QRS DURATION, ECG06: 118 MS
QTC CALCULATION (BEZET), ECG08: 451 MS
VENTRICULAR RATE, ECG03: 79 BPM

## 2023-11-01 RX ORDER — FUROSEMIDE 20 MG/1
20 TABLET ORAL DAILY
Qty: 90 TABLET | Refills: 3 | Status: SHIPPED | OUTPATIENT
Start: 2023-11-01

## 2023-11-01 RX ORDER — POTASSIUM CHLORIDE 750 MG/1
CAPSULE, EXTENDED RELEASE ORAL DAILY
Qty: 90 CAPSULE | Refills: 3 | Status: SHIPPED | OUTPATIENT
Start: 2023-11-01

## 2024-09-04 RX ORDER — FUROSEMIDE 20 MG/1
20 TABLET ORAL DAILY
Qty: 90 TABLET | Refills: 3 | OUTPATIENT
Start: 2024-09-04

## (undated) DEVICE — KENDALL RADIOLUCENT FOAM MONITORING ELECTRODE RECTANGULAR SHAPE: Brand: KENDALL

## (undated) DEVICE — CABLE PACE ALGTR CLP SAF 12FT --

## (undated) DEVICE — SUTURE ABSORBABLE BRAIDED 2-0 CT-1 27 IN UD VICRYL J259H

## (undated) DEVICE — SUTURE PERMA HND SZ 0 L18IN NONABSORBABLE BLK L30MM PSL REV 580H

## (undated) DEVICE — DRESSING HEMSTAT W4XL4IN 4 PLY WHT IMPREG KAOLIN HYDRPHLC

## (undated) DEVICE — COVADERM: Brand: DEROYAL

## (undated) DEVICE — REM POLYHESIVE ADULT PATIENT RETURN ELECTRODE: Brand: VALLEYLAB

## (undated) DEVICE — MEDI-TRACE CADENCE ADULT, DEFIBRILLATION ELECTRODE -RTS  (10 PR/PK) - PHYSIO-CONTROL: Brand: MEDI-TRACE CADENCE

## (undated) DEVICE — 3M™ IOBAN™ 2 ANTIMICROBIAL INCISE DRAPE 6640EZ: Brand: IOBAN™ 2

## (undated) DEVICE — DRAPE STRL ANGIO W/ 2 FLD COLLCTN PCH 86X135 218X343 CM 2

## (undated) DEVICE — Device

## (undated) DEVICE — DRSG AQUACEL SURG 3.5X6IN -- CONVERT TO ITEM 369227

## (undated) DEVICE — DRAPE SURG W25XL50IN E OPN CIR BND BG

## (undated) DEVICE — PENCIL ES CRD L10FT ROCK SWCH S STL HEX LOK BLDE ELECTRD

## (undated) DEVICE — LIMB HOLDER, WRIST/ANKLE: Brand: DEROYAL

## (undated) DEVICE — PACEMAKER PACK: Brand: MEDLINE INDUSTRIES, INC.

## (undated) DEVICE — INTRO SHTH 7FR 13X20CM -- TEARAWAY